# Patient Record
Sex: FEMALE | Race: WHITE | Employment: UNEMPLOYED | URBAN - METROPOLITAN AREA
[De-identification: names, ages, dates, MRNs, and addresses within clinical notes are randomized per-mention and may not be internally consistent; named-entity substitution may affect disease eponyms.]

---

## 2017-07-07 ENCOUNTER — HOSPITAL ENCOUNTER (INPATIENT)
Age: 27
LOS: 6 days | Discharge: HOME | DRG: 885 | End: 2017-07-13
Attending: STUDENT IN AN ORGANIZED HEALTH CARE EDUCATION/TRAINING PROGRAM | Admitting: STUDENT IN AN ORGANIZED HEALTH CARE EDUCATION/TRAINING PROGRAM
Payer: COMMERCIAL

## 2017-07-07 PROBLEM — F31.64 BIPOLAR DISORDER, CURR EPISODE MIXED, SEVERE, WITH PSYCHOTIC FEATURES (HCC): Status: ACTIVE | Noted: 2017-07-07

## 2017-07-07 PROCEDURE — 65220000003 HC RM SEMIPRIVATE PSYCH

## 2017-07-07 PROCEDURE — 74011250637 HC RX REV CODE- 250/637: Performed by: STUDENT IN AN ORGANIZED HEALTH CARE EDUCATION/TRAINING PROGRAM

## 2017-07-07 RX ORDER — LITHIUM CARBONATE 300 MG
300 TABLET ORAL DAILY
Status: DISCONTINUED | OUTPATIENT
Start: 2017-07-08 | End: 2017-07-11

## 2017-07-07 RX ORDER — RISPERIDONE 3 MG/1
6 TABLET, FILM COATED ORAL DAILY
COMMUNITY
End: 2017-07-13

## 2017-07-07 RX ORDER — IBUPROFEN 400 MG/1
400 TABLET ORAL
Status: DISCONTINUED | OUTPATIENT
Start: 2017-07-07 | End: 2017-07-13 | Stop reason: HOSPADM

## 2017-07-07 RX ORDER — HALOPERIDOL 5 MG/1
5 TABLET ORAL
Status: DISCONTINUED | OUTPATIENT
Start: 2017-07-07 | End: 2017-07-13 | Stop reason: HOSPADM

## 2017-07-07 RX ORDER — LITHIUM CARBONATE 300 MG
600 TABLET ORAL
Status: DISCONTINUED | OUTPATIENT
Start: 2017-07-07 | End: 2017-07-11

## 2017-07-07 RX ORDER — LORAZEPAM 2 MG/ML
1-2 INJECTION INTRAMUSCULAR
Status: DISCONTINUED | OUTPATIENT
Start: 2017-07-07 | End: 2017-07-13 | Stop reason: HOSPADM

## 2017-07-07 RX ORDER — QUETIAPINE FUMARATE 100 MG/1
100 TABLET, FILM COATED ORAL
Status: DISCONTINUED | OUTPATIENT
Start: 2017-07-07 | End: 2017-07-08

## 2017-07-07 RX ORDER — TRAZODONE HYDROCHLORIDE 50 MG/1
50 TABLET ORAL
Status: DISCONTINUED | OUTPATIENT
Start: 2017-07-07 | End: 2017-07-13 | Stop reason: HOSPADM

## 2017-07-07 RX ORDER — LORAZEPAM 1 MG/1
1-2 TABLET ORAL
Status: DISCONTINUED | OUTPATIENT
Start: 2017-07-07 | End: 2017-07-09

## 2017-07-07 RX ORDER — HALOPERIDOL 5 MG/ML
5 INJECTION INTRAMUSCULAR
Status: DISCONTINUED | OUTPATIENT
Start: 2017-07-07 | End: 2017-07-13 | Stop reason: HOSPADM

## 2017-07-07 RX ADMIN — LORAZEPAM 2 MG: 1 TABLET ORAL at 21:22

## 2017-07-07 RX ADMIN — RISPERIDONE 3 MG: 1 TABLET ORAL at 21:27

## 2017-07-07 RX ADMIN — LITHIUM CARBONATE 600 MG: 300 TABLET ORAL at 21:27

## 2017-07-07 RX ADMIN — IBUPROFEN 400 MG: 400 TABLET ORAL at 21:14

## 2017-07-07 RX ADMIN — QUETIAPINE FUMARATE 100 MG: 100 TABLET, FILM COATED ORAL at 21:27

## 2017-07-07 NOTE — BH NOTES
Patient arrived on unit escorted by security with anxious and agitation noted. Patient presents with horacio with hyperverbal speech. Patient stated she was admitted for \"Obici couldn't handle me I guess. They kept me there with that  all day and now I'm here\". Patient denies substance use other than Tobacco with \"a few a day\". Patient denies medical history. Patient denies suicidal/homicidal ideation, denies auditory/visual hallucination.

## 2017-07-07 NOTE — IP AVS SNAPSHOT
Current Discharge Medication List  
  
CONTINUE these medications which have CHANGED Dose & Instructions Dispensing Information Comments Morning Noon Evening Bedtime  
 lithium carbonate 300 mg tablet What changed:   
- how much to take - when to take this Your last dose was: Your next dose is:    
   
   
 Dose:  600 mg Take 2 Tabs by mouth two (2) times a day. Indications: BIPOLAR DISORDER Quantity:  28 Tab Refills:  0 QUEtiapine 300 mg tablet Commonly known as:  SEROquel What changed:   
- medication strength 
- how much to take Your last dose was: Your next dose is:    
   
   
 Dose:  300 mg Take 1 Tab by mouth nightly. Indications: Sonia associated with Bipolar Disorder Quantity:  14 Tab Refills:  0  
     
   
   
   
  
 risperiDONE 3 mg tablet Commonly known as:  RisperDAL What changed:   
- how much to take - when to take this 
- additional instructions Your last dose was: Your next dose is:    
   
   
 Dose:  3 mg Take 1 Tab by mouth two (2) times a day. Indications: Sonia associated with Bipolar Disorder Quantity:  28 Tab Refills:  0 STOP taking these medications   
 benztropine 0.5 mg tablet Commonly known as:  COGENTIN  
   
  
 DEPAKOTE  mg ER tablet Generic drug:  divalproex ER  
   
  
 ergocalciferol 50,000 unit capsule Commonly known as:  ERGOCALCIFEROL  
   
  
 haloperidol 10 mg tablet Commonly known as:  HALDOL  
   
  
 haloperidol 5 mg tablet Commonly known as:  HALDOL  
   
  
 NECON 1/35 (28) 1-35 mg-mcg Tab Generic drug:  norethindrone-ethinyl estradiol Where to Get Your Medications These medications were sent to [facility] [place] [address] Phone:  [phone]  
  lithium carbonate 300 mg tablet QUEtiapine 300 mg tablet risperiDONE 3 mg tablet

## 2017-07-07 NOTE — IP AVS SNAPSHOT
Steve Mcguire 
 
 
 [address] Patient: Victoriano Cotter MRN: [id number] You are allergic to the following No active allergies Recent Documentation Height Weight Breastfeeding? BMI OB Status Smoking Status 1.626 m 81.6 kg No 30.9 kg/m2 Having regular periods Current Every Day Smoker Emergency Contacts Name Discharge Info Relation Home Work Mobile Brooke Rojasjose THORNE DISCHARGE CAREGIVER [3] Parent [1] [phone] About your hospitalization You were admitted on:  July 7, 2017 You last received care in the:  [facility] SPECIAL TRT 1 You were discharged on:  July 13, 2017 Unit phone number:  [phone] Why you were hospitalized Your primary diagnosis was:  Bipolar Disorder, Curr Episode Mixed, Severe, With Psychotic Features (Hcc) Providers Seen During Your Hospitalizations Provider Role Specialty Primary office phone Kim Church MD Attending Provider Psychiatry [phone] Your Primary Care Physician (PCP) Primary Care Physician Office Phone Office Fax Jaimie Crowley [phone] Follow-up Information Follow up With Details Comments Contact Info [address] [facility]  Patient will follow up with Dr. Tabitha Ledezma on July 14, 2017 at 11:30 am at [employer]. [address] [place] [address] [address] [id number] [phone] Your Appointments Friday July 21, 2017 10:45 AM EDT  
COMPLETE PHYSICAL with Tiny Hermosillo MD  
[address] [facility] [address] [place] [address]  
[phone] Current Discharge Medication List  
  
CONTINUE these medications which have CHANGED Dose & Instructions Dispensing Information Comments Morning Noon Evening Bedtime lithium carbonate 300 mg tablet What changed:   
- how much to take - when to take this Your last dose was: Your next dose is:    
   
   
 Dose:  600 mg Take 2 Tabs by mouth two (2) times a day. Indications: BIPOLAR DISORDER Quantity:  28 Tab Refills:  0 QUEtiapine 300 mg tablet Commonly known as:  SEROquel What changed:   
- medication strength 
- how much to take Your last dose was: Your next dose is:    
   
   
 Dose:  300 mg Take 1 Tab by mouth nightly. Indications: Sonia associated with Bipolar Disorder Quantity:  14 Tab Refills:  0  
     
   
   
   
  
 risperiDONE 3 mg tablet Commonly known as:  RisperDAL What changed:   
- how much to take - when to take this 
- additional instructions Your last dose was: Your next dose is:    
   
   
 Dose:  3 mg Take 1 Tab by mouth two (2) times a day. Indications: Sonia associated with Bipolar Disorder Quantity:  28 Tab Refills:  0 STOP taking these medications   
 benztropine 0.5 mg tablet Commonly known as:  COGENTIN  
   
  
 DEPAKOTE  mg ER tablet Generic drug:  divalproex ER  
   
  
 ergocalciferol 50,000 unit capsule Commonly known as:  ERGOCALCIFEROL  
   
  
 haloperidol 10 mg tablet Commonly known as:  HALDOL  
   
  
 haloperidol 5 mg tablet Commonly known as:  HALDOL  
   
  
 NECON 1/35 (28) 1-35 mg-mcg Tab Generic drug:  norethindrone-ethinyl estradiol Where to Get Your Medications These medications were sent to [address] [place] [address] [address] [address] Phone:  [phone]  
  lithium carbonate 300 mg tablet QUEtiapine 300 mg tablet  
 risperiDONE 3 mg tablet Discharge Instructions BEHAVIORAL HEALTH NURSING DISCHARGE NOTE Emergency Numbers [facility] [facility] Desk: [phone] [place] [facility]: [phone] Suicide Prevention Line: [phone] (TALK) The following personal items collected during your admission are returned to you:  
Dental Appliance: Dental Appliances: None Vision: Visual Aid: Contacts Hearing Aid:   
Jewelry: Jewelry: Bracelet, Earrings, Necklace, Ring (Security Safe) Clothing: Clothing: Shorts, Shirt, Undergarments, Other (comment) (flip flops) Other Valuables: Other Valuables:  Yefri Pickup glasses) Valuables sent to safe: Personal Items Sent to Safe:  Ansari Coma w/ I.D;Jewelry(2-rings;ear rings;bracelet;chain)lhtr) The discharge information has been reviewed with the patient. The patient verbalized understanding. GuestMetrics Activation Thank you for requesting access to Guest[employer]. Please follow the instructions below to securely access and download your online medical record. Guest[employer] allows you to send messages to your doctor, view your test results, renew your prescriptions, schedule appointments, and more. How Do I Sign Up? 1. In your internet browser, go to [email or web address] 
2. Click on the First Time User? Click Here link in the Sign In box. You will be redirect to the New Member Sign Up page. 3. Enter your GuestMetrics Access Code exactly as it appears below. You will not need to use this code after youve completed the sign-up process. If you do not sign up before the expiration date, you must request a new code. Guest[employer] Access Code: Activation code not generated Current GuestMetrics Status: Active (This is the date your GuestMetrics access code will ) 4. Enter the last four digits of your Social Security Number (xxxx) and Date of Birth (mm/dd/yyyy) as indicated and click Submit. You will be taken to the next sign-up page. 5. Create a GuestMetrics ID. This will be your GuestMetrics login ID and cannot be changed, so think of one that is secure and easy to remember. 6. Create a Lightning[employer] password. You can change your password at any time. 7. Enter your Password Reset Question and Answer. This can be used at a later time if you forget your password. 8. Enter your e-mail address. You will receive e-mail notification when new information is available in [address] 9. Click Sign Up. You can now view and download portions of your medical record. 10. Click the Download Summary menu link to download a portable copy of your medical information. Additional Information If you have questions, please visit the Frequently Asked Questions section of the [employer] website at [email or web address] Remember, LightningBuy is NOT to be used for urgent needs. For medical emergencies, dial 911. Patient armband removed and shredded Discharge Orders None [facility] SERVICES! Dear Saida Marte: 
Thank you for requesting a Lightning[employer] account. Our records indicate that you already have an active [employer] account. You can access your account anytime at [email or web address] Did you know that you can access your hospital and ER discharge instructions at any time in [employer]? You can also review all of your test results from your hospital stay or ER visit. Additional Information If you have questions, please visit the Frequently Asked Questions section of the [employer] website at [email or web address] Remember, [employer] is NOT to be used for urgent needs. For medical emergencies, dial 911. Now available from your iPhone and Android! General Information Please provide this summary of care documentation to your next provider. Patient Signature:  ____________________________________________________________ Date:  ____________________________________________________________  
  
Coy Matsu Provider Signature:  ____________________________________________________________ Date:  ____________________________________________________________

## 2017-07-08 PROCEDURE — 65220000003 HC RM SEMIPRIVATE PSYCH

## 2017-07-08 PROCEDURE — 74011250637 HC RX REV CODE- 250/637: Performed by: STUDENT IN AN ORGANIZED HEALTH CARE EDUCATION/TRAINING PROGRAM

## 2017-07-08 PROCEDURE — 74011250637 HC RX REV CODE- 250/637: Performed by: PSYCHIATRY & NEUROLOGY

## 2017-07-08 RX ORDER — NICOTINE 7MG/24HR
1 PATCH, TRANSDERMAL 24 HOURS TRANSDERMAL DAILY
Status: DISCONTINUED | OUTPATIENT
Start: 2017-07-08 | End: 2017-07-13 | Stop reason: HOSPADM

## 2017-07-08 RX ORDER — QUETIAPINE FUMARATE 100 MG/1
100 TABLET, FILM COATED ORAL 2 TIMES DAILY
Status: DISCONTINUED | OUTPATIENT
Start: 2017-07-08 | End: 2017-07-09

## 2017-07-08 RX ADMIN — LITHIUM CARBONATE 600 MG: 300 TABLET ORAL at 20:58

## 2017-07-08 RX ADMIN — LORAZEPAM 2 MG: 1 TABLET ORAL at 08:22

## 2017-07-08 RX ADMIN — RISPERIDONE 3 MG: 1 TABLET ORAL at 08:22

## 2017-07-08 RX ADMIN — TRAZODONE HYDROCHLORIDE 50 MG: 50 TABLET ORAL at 20:58

## 2017-07-08 RX ADMIN — LITHIUM CARBONATE 300 MG: 300 TABLET ORAL at 08:22

## 2017-07-08 RX ADMIN — QUETIAPINE FUMARATE 100 MG: 100 TABLET, FILM COATED ORAL at 20:58

## 2017-07-08 RX ADMIN — RISPERIDONE 3 MG: 1 TABLET ORAL at 20:58

## 2017-07-08 NOTE — H&P
History and Physical        Patient: Godwin Felix               Sex: female          DOA: 2017         YOB: 1990      Age:  27 y.o.        LOS:  LOS: 1 day      medhat  HPI:     Godwin eFlix is a 27 y.o. female who was admitted under TDO experiencing psychosis, pressured speech, delusional thinking. Principal Problem:    Bipolar disorder, curr episode mixed, severe, with psychotic features ([facility] [address] (2017)        Past Medical History:   Diagnosis Date    Bipolar depression ([facility]     Desmoid tumor 2014    for excision       Past Surgical History:   Procedure Laterality Date    HX  SECTION  2009     vaginal birth then c section       Family History   Problem Relation Age of Onset    Asthma Mother     Hypertension Father     Elevated Lipids Father     Diabetes Father     Heart Disease Father        Social History     Social History    Marital status:      Spouse name: N/A    Number of children: 2    Years of education: GED     Occupational History    n/a      Social History Main Topics    Smoking status: Current Every Day Smoker     Packs/day: 1.00     Years: 9.00     Types: Cigarettes    Smokeless tobacco: Never Used    Alcohol use No      Comment: 1 x per week (socially)    Drug use: No    Sexual activity: Yes     Partners: Male     Birth control/ protection: Pill     Other Topics Concern    None     Social History Narrative   Patient states she lives with her grandparents. States her appetite and sleep have been okay. She  Is unemployed. Prior to Admission medications    Medication Sig Start Date End Date Taking? Authorizing Provider   risperiDONE (RISPERDAL) 3 mg tablet Take 6 mg by mouth daily. Patient is unsure of medication schedule   Yes Historical Provider   QUEtiapine (SEROQUEL) 50 mg tablet Take 50 mg by mouth nightly. 3/6/15  Yes Historical Provider   norethindrone-ethinyl estradiol (Santo Hylan , ,) 1-35 mg-mcg per tablet Take  by mouth. Yes Historical Provider   ergocalciferol (ERGOCALCIFEROL) 50,000 unit capsule Take 1 Cap by mouth every seven (7) days. 9/1/15   Mary Hernadez MD   haloperidol (HALDOL) 5 mg tablet Take 5 mg by mouth daily. 3/6/15   Historical Provider   divalproex ER (DEPAKOTE ER) 500 mg ER tablet Take 500 mg by mouth two (2) times a day. Historical Provider   lithium 300 mg tablet Take 900 mg by mouth daily. Historical Provider   haloperidol (HALDOL) 10 mg tablet Take 5 mg by mouth daily. Historical Provider   benztropine (COGENTIN) 0.5 mg tablet Take 0.5 mg by mouth daily. Historical Provider       No Known Allergies    Review of Systems  A comprehensive review of systems was negative. Physical Exam:      Visit Vitals    /68 (BP 1 Location: Right arm, BP Patient Position: Sitting)    Pulse 72    Temp 97.1 °F (36.2 °C)    Resp 18    Ht 5' 4\" (1.626 m)    Wt 180 lb (81.6 kg)    Breastfeeding No    BMI 30.9 kg/m2       Physical Exam:    General:  Alert, cooperative, well developed, well nourished  female,no distress, appears stated age. Eyes:  Conjunctivae/corneas clear. PERRL, EOMs intact. Fundi benign   Ears:  Normal TMs and external ear canals both ears. Nose: Nares normal. Septum midline. Mucosa normal. No drainage or sinus tenderness. Mouth/Throat: Lips, mucosa, and tongue normal. Teeth in disrepair and gums normal.   Neck: Supple, symmetrical, trachea midline, no adenopathy, thyroid: no enlargement/tenderness/nodules, no carotid bruit and no JVD. Back:   Symmetric, no curvature. ROM normal. No CVA tenderness. Lungs:   Clear to auscultation bilaterally. Heart:  Regular rate and rhythm, S1, S2 normal, no murmur, click, rub or gallop. Abdomen:   Soft, non-tender. Bowel sounds normal. No masses,  No organomegaly. Extremities: Extremities normal, atraumatic, no cyanosis or edema. Pulses: 2+ and symmetric all extremities.    Skin: Skin color, texture, turgor normal. No rashes or lesions   Lymph nodes: Cervical, supraclavicular, and axillary nodes normal.   Neurologic: CNII-XII intact. Normal strength, sensation and reflexes throughout.              Assessment/Plan     Delusional  Psychosis  Pressured speech  Labs reviewed  Continue treatment per physician's orders
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
## 2017-07-08 NOTE — PROGRESS NOTES
Problem: Psychosis  Goal: *STG: Decreased hallucinations  Patient will verbalize denial of auditory/visual hallucinations every shift throughout hospital stay. Outcome: Progressing Towards Goal  Patient denies auditory hallucination. Goal: *STG/LTG: Complies with medication therapy  Patient will take medication as prescribed every shift throughout hospital stay. Outcome: Progressing Towards Goal  Patient has been compliant with prescribed medication. Patient has been in and out of her room throughout the morning with manic behavior and intermittent agitation. Patient has been easily redirectable however continually approaches writer requesting to of over to visit on the other side stating \"I need to do something I just need to do something\". Patient has been observed arguing with mother on telephone throughout the morning. Patient received Ativan 2 mg po for anxious and restlessness with increasing moments of agitation however has not presented with aggressive behaviors towards staff. Patient denies suicidal ideation, Patient states \"I don't hear stuff like that ok\". Patient appears tired however states she is unable to sleep and after resting for 10-15 minutes will get up and state \"I'm not tired I sept all day now see\". Will provide reality orientation and and support as needed throughout treatment regimen.

## 2017-07-08 NOTE — BH NOTES
Patient's mother presented to nursing station with demanding and entitled behavior. Mother demanding to know why patient's MD had not been called and made aware of patient's admission. Mother wanted writer to assure her that patient would not be released. Patient attempting to bring items in that were not allowed. Mother was previously advised of allowable items with verbalization of understanding. Patient's mother was educated and assured SW would be made aware of her request. Patient was observed pacing throughout milieu all shift and hs been compliant with prescribed medications. Patient denied suicidal ideation, denies auditory hallucinations. Will continue to monitor for safety with support and education throughout treatment regimen.

## 2017-07-09 LAB
ALBUMIN SERPL BCP-MCNC: 3.6 G/DL (ref 3.4–5)
ALBUMIN/GLOB SERPL: 1.1 {RATIO} (ref 0.8–1.7)
ALP SERPL-CCNC: 76 U/L (ref 45–117)
ALT SERPL-CCNC: 36 U/L (ref 13–56)
ANION GAP BLD CALC-SCNC: 5 MMOL/L (ref 3–18)
AST SERPL W P-5'-P-CCNC: 15 U/L (ref 15–37)
BASOPHILS # BLD AUTO: 0 K/UL (ref 0–0.1)
BASOPHILS # BLD: 0 % (ref 0–2)
BILIRUB SERPL-MCNC: 0.3 MG/DL (ref 0.2–1)
BUN SERPL-MCNC: 9 MG/DL (ref 7–18)
BUN/CREAT SERPL: 12 (ref 12–20)
CALCIUM SERPL-MCNC: 8.9 MG/DL (ref 8.5–10.1)
CHLORIDE SERPL-SCNC: 106 MMOL/L (ref 100–108)
CHOLEST SERPL-MCNC: 143 MG/DL
CO2 SERPL-SCNC: 29 MMOL/L (ref 21–32)
CREAT SERPL-MCNC: 0.74 MG/DL (ref 0.6–1.3)
DIFFERENTIAL METHOD BLD: ABNORMAL
EOSINOPHIL # BLD: 0.2 K/UL (ref 0–0.4)
EOSINOPHIL NFR BLD: 2 % (ref 0–5)
ERYTHROCYTE [DISTWIDTH] IN BLOOD BY AUTOMATED COUNT: 13.5 % (ref 11.6–14.5)
EST. AVERAGE GLUCOSE BLD GHB EST-MCNC: 105 MG/DL
GLOBULIN SER CALC-MCNC: 3.4 G/DL (ref 2–4)
GLUCOSE SERPL-MCNC: 96 MG/DL (ref 74–99)
HBA1C MFR BLD: 5.3 % (ref 4.2–5.6)
HCT VFR BLD AUTO: 38.5 % (ref 35–45)
HDLC SERPL-MCNC: 46 MG/DL (ref 40–60)
HDLC SERPL: 3.1 {RATIO} (ref 0–5)
HGB BLD-MCNC: 12.8 G/DL (ref 12–16)
LDLC SERPL CALC-MCNC: 82.6 MG/DL (ref 0–100)
LIPID PROFILE,FLP: NORMAL
LITHIUM SERPL-SCNC: 0.68 MMOL/L (ref 0.6–1.2)
LYMPHOCYTES # BLD AUTO: 29 % (ref 21–52)
LYMPHOCYTES # BLD: 2.6 K/UL (ref 0.9–3.6)
MCH RBC QN AUTO: 31.8 PG (ref 24–34)
MCHC RBC AUTO-ENTMCNC: 33.2 G/DL (ref 31–37)
MCV RBC AUTO: 95.5 FL (ref 74–97)
MONOCYTES # BLD: 0.5 K/UL (ref 0.05–1.2)
MONOCYTES NFR BLD AUTO: 6 % (ref 3–10)
NEUTS SEG # BLD: 5.6 K/UL (ref 1.8–8)
NEUTS SEG NFR BLD AUTO: 63 % (ref 40–73)
PLATELET # BLD AUTO: 226 K/UL (ref 135–420)
PMV BLD AUTO: 11 FL (ref 9.2–11.8)
POTASSIUM SERPL-SCNC: 4.1 MMOL/L (ref 3.5–5.5)
PROT SERPL-MCNC: 7 G/DL (ref 6.4–8.2)
RBC # BLD AUTO: 4.03 M/UL (ref 4.2–5.3)
SODIUM SERPL-SCNC: 140 MMOL/L (ref 136–145)
TRIGL SERPL-MCNC: 72 MG/DL (ref ?–150)
TSH SERPL DL<=0.05 MIU/L-ACNC: 2.47 UIU/ML (ref 0.36–3.74)
VLDLC SERPL CALC-MCNC: 14.4 MG/DL
WBC # BLD AUTO: 8.8 K/UL (ref 4.6–13.2)

## 2017-07-09 PROCEDURE — 80178 ASSAY OF LITHIUM: CPT | Performed by: PSYCHIATRY & NEUROLOGY

## 2017-07-09 PROCEDURE — 83036 HEMOGLOBIN GLYCOSYLATED A1C: CPT | Performed by: PSYCHIATRY & NEUROLOGY

## 2017-07-09 PROCEDURE — 85025 COMPLETE CBC W/AUTO DIFF WBC: CPT | Performed by: PSYCHIATRY & NEUROLOGY

## 2017-07-09 PROCEDURE — 36415 COLL VENOUS BLD VENIPUNCTURE: CPT | Performed by: PSYCHIATRY & NEUROLOGY

## 2017-07-09 PROCEDURE — 80053 COMPREHEN METABOLIC PANEL: CPT | Performed by: PSYCHIATRY & NEUROLOGY

## 2017-07-09 PROCEDURE — 80061 LIPID PANEL: CPT | Performed by: PSYCHIATRY & NEUROLOGY

## 2017-07-09 PROCEDURE — 74011250637 HC RX REV CODE- 250/637: Performed by: STUDENT IN AN ORGANIZED HEALTH CARE EDUCATION/TRAINING PROGRAM

## 2017-07-09 PROCEDURE — 74011250637 HC RX REV CODE- 250/637: Performed by: PSYCHIATRY & NEUROLOGY

## 2017-07-09 PROCEDURE — 65220000003 HC RM SEMIPRIVATE PSYCH

## 2017-07-09 PROCEDURE — 84443 ASSAY THYROID STIM HORMONE: CPT | Performed by: PSYCHIATRY & NEUROLOGY

## 2017-07-09 RX ORDER — QUETIAPINE FUMARATE 200 MG/1
200 TABLET, FILM COATED ORAL
Status: DISCONTINUED | OUTPATIENT
Start: 2017-07-09 | End: 2017-07-12

## 2017-07-09 RX ORDER — HYDROXYZINE PAMOATE 50 MG/1
50 CAPSULE ORAL
Status: DISCONTINUED | OUTPATIENT
Start: 2017-07-09 | End: 2017-07-13 | Stop reason: HOSPADM

## 2017-07-09 RX ADMIN — RISPERIDONE 3 MG: 1 TABLET ORAL at 20:10

## 2017-07-09 RX ADMIN — LORAZEPAM 1 MG: 1 TABLET ORAL at 02:36

## 2017-07-09 RX ADMIN — QUETIAPINE FUMARATE 100 MG: 100 TABLET, FILM COATED ORAL at 08:13

## 2017-07-09 RX ADMIN — HYDROXYZINE PAMOATE 50 MG: 25 CAPSULE ORAL at 20:10

## 2017-07-09 RX ADMIN — LITHIUM CARBONATE 300 MG: 300 TABLET ORAL at 08:13

## 2017-07-09 RX ADMIN — LITHIUM CARBONATE 600 MG: 300 TABLET ORAL at 20:09

## 2017-07-09 RX ADMIN — RISPERIDONE 3 MG: 1 TABLET ORAL at 08:13

## 2017-07-09 RX ADMIN — QUETIAPINE FUMARATE 200 MG: 200 TABLET, FILM COATED ORAL at 20:10

## 2017-07-09 NOTE — PROGRESS NOTES
[address] [facility] [address]  Inpatient Progress Note     Date of Service: 07/09/17  Hospital Day: 2     Subjective/Interval History   07/09/17    Treatment Team Notes:  Notes reviewed and/or discussed and report that Jennifer Guzman is on 1:1 due to hx of sexualized behaviors while manic. She received Ativan at 8am yesterday and 2am this morning. Pt has appeared restless after receiving Ativan this morning. Patient interview: Jennifer Guzman was interviewed by this writer today. Pt was visited by her mother. She reports conflict with mom prior to hospitalization \"because we both are manic. \" Pt reported sleeping during the day and getting up at 2am because she slept to much. Pt denies SI/HI/AVH. Pt feels that morning Seroquel is causing her to be drowsy. Objective     Vitals:    07/07/17 1951 07/08/17 1112 07/08/17 2030 07/09/17 0742   BP: 116/72 106/68 112/71 114/67   Pulse: 94 72 87 80   Resp: 17 18 17 16   Temp: 96.8 °F (36 °C) 97.1 °F (36.2 °C) 97 °F (36.1 °C) 96.7 °F (35.9 °C)   Weight:       Height:           Mental Status Examination     Appearance/Hygiene CF, good hygiene, poor dentition    Behavior/Social Relatedness Appropriate   Musculoskeletal Gait/Station: appropriate  Psychomotor (hyperkinetic, hypokinetic): appropriate   Involuntary movements (tics, dyskinesias, akathisa, stereotypies): none   Speech   Rate, rhythm, volume, fluency and articulation are appropriate   Mood   euthymic   Affect    stable   Thought Process Linear   Thought Content and Perceptual Disturbances Denies self-injurious behavior (SIB), suicidal ideation (SI), aggressive behavior or homicidal ideation (HI)    Denies auditory and visual hallucinations   Sensorium and Cognition  AOx4, attention intact, memory intact, language use appropriate, and fund of knowledge age appropriate   Insight  fair   Judgment fair        Assessment/Plan      PSYCHIATRIC DIAGNOSES:  1. Bipolar 1 disorder, most recent episode manic.   2. Nicotine use disorder, severe. 3.  Marijuana use disorder, mild.     MEDICAL DIAGNOSES:  None.     PSYCHOSOCIAL AND CONTEXTUAL FACTORS:  Conflict with mother and  noncompliance with outpatient treatment.     LEVEL OF IMPAIRMENT/DISABILITY:  Severe.     The patient is a 78-year-old  female who is experiencing increased day time sedation from the increase of SEroquel to 100mg BID. Will consolidate and follow. 1.  Convert Seroquel IR 200m BID to Seroquel XR 200mg nightly  3. Continue Risperdal 3 mg p.o. b.i.d.  4.  Continue lithium 300 mg in the morning and 600 mg at night. Li .68  5.  will assist in identifying outpatient mental health  resources.   6.  Tentative discharge is pending.           Reza Traylor MD

## 2017-07-09 NOTE — BH NOTES
Patient napped for a short while at start of shift. Patient spent the majority of this shift in day room participating in milieu activities and socializing with peers. Patient did not need redirection during this shift. Patient was provided with 1:1 support throughout shift and displayed no behavioral issues. Patient presented restless at times but was able to focus attention on things such as puzzles, coloring and phone calls to family. Will continue to provide 1:1 support.

## 2017-07-09 NOTE — PROGRESS NOTES
Problem: Psychosis  Goal: *STG: Decreased hallucinations  Patient will verbalize denial of auditory/visual hallucinations every shift throughout hospital stay. Outcome: Progressing Towards Goal  Patient denies auditory/visual hallucinations. Goal: *STG: Remains safe in hospital  Patient will remain free of harm to self and others every shift throughout hospitalization. Outcome: Progressing Towards Goal  Patient has remained free of harm to self and others while in facility. Goal: *STG/LTG: Complies with medication therapy  Patient will take medication as prescribed every shift throughout hospital stay. Outcome: Progressing Towards Goal  Patient has been compliant with prescribed medication. Comments:   Patient was initially anxious, restless and hyperverbal with patient complaining of feeling like she needed to do something. Patient is easily redirectable however required education related to boundaries and behaviors towards male staff that enter unit. Patient questions whether males will be admitted to unit and when MD entered unit patient stated \"oh who's that, oh he's cute\". Patient was open to education regarding expected behaviors on unit. Patient has showered and completed daily hygiene care. After receiving scheduled medication patient was less anxious however continue to pace throughout milieu. Patient denies suicidal ideation with no safety issues noted, denies auditory hallucinations. Will continue to monitor for safety and poor boundaries with male peers. While providing support and education throughout treatment regimen.

## 2017-07-09 NOTE — BH NOTES
GROUP THERAPY PROGRESS NOTE    Soumya Ritter is participating in [place].      Group time: 30 minutes    Personal goal for participation: use the phone    Goal orientation: community    Group therapy participation: minimal    Therapeutic interventions reviewed and discussed: goals and procedures    Impression of participation: encouraged

## 2017-07-09 NOTE — BH NOTES
GROUP THERAPY PROGRESS NOTE    Silva Severe is participating in Leisure-Creative Group.      Group time: 45 minutes    Personal goal for participation: relaxation and education     Goal orientation: relaxation    Group therapy participation: active    Therapeutic interventions reviewed and discussed: patient watched educational program     Impression of participation: happy

## 2017-07-09 NOTE — BH NOTES
Pt woke up at approximately 0231 and asked this writer for the time. \"Man,I've been sleep for over eight hours. I can't sleep anymore. \" It was explained to her that she was only asleep for 4 1/2 hours and it was still early in the morning. Pt nodded \"okay\" and stated that her contacts were bothering her. RN gave her a container to place them in. Pt was a little restless; went back and forth between her room and the restroom. RN offered her medication for anxiety and PT complied. Pt fell asleep at approximately 0400, but is awake at this time. Pt appears to be very drowsy and speech is slurred; encouraged her to lay back down and let the medication do it's job. Pt seems to be fighting sleep; repeatedly lays down and gets back up. Pt is pleasant, just a little anxious. Will continue to monitor throughout the shift.

## 2017-07-09 NOTE — BH NOTES
GROUP THERAPY PROGRESS NOTE    Levorn Severe is participating in Recreational Therapy. Group time: 45 minutes    Personal goal for participation: relaxation     Goal orientation: relaxation    Group therapy participation: active    Therapeutic interventions reviewed and discussed:  PT participated in board game.      Impression of participation: happy

## 2017-07-10 PROCEDURE — 74011250637 HC RX REV CODE- 250/637: Performed by: STUDENT IN AN ORGANIZED HEALTH CARE EDUCATION/TRAINING PROGRAM

## 2017-07-10 PROCEDURE — 65220000003 HC RM SEMIPRIVATE PSYCH

## 2017-07-10 PROCEDURE — 74011250637 HC RX REV CODE- 250/637: Performed by: PSYCHIATRY & NEUROLOGY

## 2017-07-10 RX ADMIN — QUETIAPINE FUMARATE 200 MG: 200 TABLET, FILM COATED ORAL at 20:03

## 2017-07-10 RX ADMIN — RISPERIDONE 3 MG: 1 TABLET ORAL at 20:02

## 2017-07-10 RX ADMIN — IBUPROFEN 400 MG: 400 TABLET ORAL at 16:56

## 2017-07-10 RX ADMIN — LITHIUM CARBONATE 600 MG: 300 TABLET ORAL at 20:03

## 2017-07-10 RX ADMIN — IBUPROFEN 400 MG: 400 TABLET ORAL at 05:06

## 2017-07-10 RX ADMIN — RISPERIDONE 3 MG: 1 TABLET ORAL at 08:10

## 2017-07-10 RX ADMIN — HYDROXYZINE PAMOATE 50 MG: 25 CAPSULE ORAL at 05:06

## 2017-07-10 RX ADMIN — HYDROXYZINE PAMOATE 50 MG: 25 CAPSULE ORAL at 23:33

## 2017-07-10 RX ADMIN — TRAZODONE HYDROCHLORIDE 50 MG: 50 TABLET ORAL at 02:27

## 2017-07-10 RX ADMIN — IBUPROFEN 400 MG: 400 TABLET ORAL at 20:03

## 2017-07-10 RX ADMIN — LITHIUM CARBONATE 300 MG: 300 TABLET ORAL at 08:11

## 2017-07-10 NOTE — PROGRESS NOTES
Problem: Psychosis  Goal: *STG: Decreased hallucinations  Patient will verbalize denial of auditory/visual hallucinations every shift throughout hospital stay. Outcome: Progressing Towards Goal  Patient denies auditory hallucinations. Goal: *STG: Remains safe in hospital  Patient will remain free of harm to self and others every shift throughout hospitalization. Outcome: Progressing Towards Goal  Patient has remained free of harm to self and others while in facility. Goal: *STG/LTG: Complies with medication therapy  Patient will take medication as prescribed every shift throughout hospital stay. Outcome: Progressing Towards Goal  Patient has been compliant with prescribed medication. Comments:   Patient has been cooperative since returning from court. Patient was initially upset after returning from court however was able to process and has not been a management problem. Patient paces throughout day area and has been a bit anxious with fidgety behavior. Patient reported she is trying to set discharge goal however minimizes her part when discussing her hyper sexual behaviors. Patient denies auditory hallucinations. Patient has been receptive and open to education. Will continue to monitor for safety.

## 2017-07-10 NOTE — BH NOTES
GROUP THERAPY PROGRESS NOTE    Kassidy Pedro is participating in Recreational Therapy. Group time: 25 minutes    Personal goal for participation: Relaxation/Social    Goal orientation: relaxation    Group therapy participation: active    Therapeutic interventions reviewed and discussed: Relaxation/Social    Impression of participation: Pt fully engaged in Recreational Therapy. Pt played basketball and sat in the breeze with this writer. Pt took the time to open up about significant life events;had a great conversation about her family.

## 2017-07-10 NOTE — BSMART NOTE
OCCUPATIONAL THERAPY PROGRESS NOTE    Group Time:  8582  Attendance: The patient attended 1/4 of group. The patient left and returned to activity at least four times. Participation:  The patient participated with minimal elaboration in the activity. Attention:  The patient was unable to attend to the activity. Interaction:  The patient frequently interacts with others. In and out of group often. Appeared restless and unable to focus.

## 2017-07-10 NOTE — BH NOTES
Pt woke up at approximately 0210. Pt offered PRN medication for sleep;initially refused, but 15 minutes later decided to take it. Will continue to monitor for safety throughout the shift.

## 2017-07-10 NOTE — PROGRESS NOTES
[address]  Inpatient Progress Note     Date of Service: 07/10/17  Hospital Day: 3     Subjective/Interval History   07/10/17    Treatment Team Notes:  Notes reviewed and/or discussed and report that John Murdock is on 1:1 due to hx of sexualized behaviors while manic. Patient interview: John Murdock was interviewed by this writer today. Appears anxious and labile at times, pacing room and very fidgety, walking out of room at times and had to be redirected to finish assessment. Otherwise, calm, cooperative. Hyperverbal but interruptable and prone to off topic and tangentiality but redirectable. Pt denies SI/HI/AVH. Objective     Vitals:    07/08/17 2030 07/09/17 0742 07/09/17 1914 07/10/17 0745   BP: 112/71 114/67 121/74 116/73   Pulse: 87 80 87 78   Resp: 17 16 17 18   Temp: 97 °F (36.1 °C) 96.7 °F (35.9 °C) 95.1 °F (35.1 °C) 97.3 °F (36.3 °C)   Weight:       Height:           Mental Status Examination     Appearance/Hygiene CF, good hygiene, poor dentition    Behavior/Social Relatedness Appropriate   Musculoskeletal Gait/Station: appropriate  Psychomotor (hyperkinetic, hypokinetic): appropriate   Involuntary movements (tics, dyskinesias, akathisa, stereotypies): none   Speech   Hyperverbal, anxious tone   Mood   \"okay\"   Affect    anxious   Thought Process Tangential at times   Thought Content and Perceptual Disturbances Denies self-injurious behavior (SIB), suicidal ideation (SI), aggressive behavior or homicidal ideation (HI)    Denies auditory and visual hallucinations   Sensorium and Cognition  AOx4, attention intact, memory intact, language use appropriate, and fund of knowledge age appropriate   Insight  fair   Judgment fair        Assessment/Plan      PSYCHIATRIC DIAGNOSES:  1. Bipolar 1 disorder, most recent episode manic. 2.  Nicotine use disorder, severe.   3.  Marijuana use disorder, mild.     MEDICAL DIAGNOSES:  None.     PSYCHOSOCIAL AND CONTEXTUAL FACTORS:  Break up with boyfriend, Conflict with mother and  noncompliance with outpatient treatment.     LEVEL OF IMPAIRMENT/DISABILITY:  Severe.     The patient is a 22-year-old  female who remains manic, continuing to manage medications and contain environment and safety. 1.  Continue Seroquel XR 200mg nightly  3. Continue Risperdal 3 mg p.o. b.i.d.  4.  Continue lithium 300 mg in the morning and 600 mg at night. Li .68  5.  will assist in identifying outpatient mental health  resources.   6.  Tentative discharge is pending.        Luis Felipe Hamilton MD

## 2017-07-10 NOTE — BH NOTES
GROUP THERAPY PROGRESS NOTE    Cris Nudinh is participating in [place].      Group time: 30 minutes    Personal goal for participation: discuss daily Tx goal(s), discuss unit issues and guideline compliance         Goal orientation: personal    Group therapy participation: active

## 2017-07-10 NOTE — BH NOTES
Pt was positive throughout the shift today; not a management problem. Pt was a 1:1 with this writer and opened up quite a bit. Pt spoke about her son and daughter, whom she states that she misses very much. 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 Pt states that she goes to court tomorrow and is hoping that she is discharged soon after. Pt did not have visitors this shift, but was observed talking on the phone to her children, mother and father; conversation seemed to go well. Pt was very fidgety and anxious throughout the shift; states that it's just hard for her to keep still, even when she's at home. She completed personal hygiene tasks for the evening, ate most of her dinner meal and 100% of her snack. Pt is utilizing non-skid footwear and is free of falls. Pt denies SI, HI, AH, and VH. Pt contracts for safety on the unit. Will continue to monitor.

## 2017-07-10 NOTE — BSMART NOTE
Pt ate 100 percent of her breakfast and lunch tray. Pt is cooperative during approached. Pt participated in the community group during shift. Pt wear proper footwear on the unit. Pt interacted with peers and staffs. Pt played games with staffs and peers. Pt was angry earlier because she couldn't go home today. Pt denied any self harm and suicidal ideation during ship. Will continue to monitor pt for location and direction.

## 2017-07-10 NOTE — BH NOTES
FREDRICK discussed case with the treating psychiatrist    FREDRICK Contact: Pt. Is a 27year old female mother of two with history of Bipolar disorder and history of past psych hospitalizations. .  Pt. Was admitted to this facility for manic behaviors and medication non-compliance. FREDRICK met with the pt. To discuss the reason for this admission. The pt. Stated she suffers from Bipolar disorder. Pt admits that she has been suffering from the disorder as a result of post partnum. The pt. Stated she had been stable and medication complaint for the last five years. Pt. Stated she recently broke up with her boyfriend of 68 years. Pt. Stated she has lived with  Individual for the last 5 years. Pt stated she recently moved to her mother's home a couple of days prior to this hospitalization. Pt stated her mother is bipolar as well. Pt stated her ans her mother frequently get into arguments. Pt stated she left her mother home because her mother told her to get out. Pt stated I was not running into traffic or at least I do not think I was. Pt. Attempted to provide a description of her actions and location of her current residence. Pt. Stated she just has been feeling overwhelmed. Pt. Stated she plans to get stable on medication. Pt. Plans to return to her parents home. The pt. receives [facility] mental health services with [phone]. FREDRICK will contact pt.'s CM at [facility]  And mother for a collateral.    FREDRICK Collateral:  FREDRICK was contacted by Dr. Otis Garcia @ [phone] psychiatrist at [address] [place] [address]. Dr. Otis Garcia stated pt. Had became manic. The psychiatrist recommended d for pt to be admitted inpatient. Dr. Otis Garcia stated the pt. Has been referred to PACT services at [facility]. The PACT staff will come out and do an intake assessment. Beryl Orozco PACT staff will come on 7/12/17 @ 2:00.     FREDRICK  Informed the treating psychiatrist

## 2017-07-10 NOTE — BSMART NOTE
ART THERAPY GROUP PROGRESS NOTE    PATIENT SCHEDULED FOR GROUP AT: 14:00    ATTENDANCE: Full    PARTICIPATION LEVEL: Participates fully in the art proces    ATTENTION LEVEL: Able to focus on task    FOCUS: Grounding/ Anxiety reduction     SYMBOLIC & THEMATIC CONTENT AS NOTED IN IMAGERY: She appeared some-what anxious and was noted to shake her leg under the table as she worked on the task. She was agitated by a disruptive group member, however was able to manage her emotions and behavior and removed herself when irritated. She appeared to be focused on male peers, at one point noted to be sitting in a sexualized posture while looking at a male member across the room. She was responsive to re-direction and was able to focus on the task at hand. Her associations and approach to imagery were organized and appropriate.

## 2017-07-11 PROCEDURE — 65220000003 HC RM SEMIPRIVATE PSYCH

## 2017-07-11 PROCEDURE — 74011250637 HC RX REV CODE- 250/637: Performed by: STUDENT IN AN ORGANIZED HEALTH CARE EDUCATION/TRAINING PROGRAM

## 2017-07-11 PROCEDURE — 74011250637 HC RX REV CODE- 250/637: Performed by: PSYCHIATRY & NEUROLOGY

## 2017-07-11 RX ORDER — LITHIUM CARBONATE 300 MG
600 TABLET ORAL 2 TIMES DAILY
Status: DISCONTINUED | OUTPATIENT
Start: 2017-07-11 | End: 2017-07-13 | Stop reason: HOSPADM

## 2017-07-11 RX ADMIN — TRAZODONE HYDROCHLORIDE 50 MG: 50 TABLET ORAL at 01:51

## 2017-07-11 RX ADMIN — HYDROXYZINE PAMOATE 50 MG: 25 CAPSULE ORAL at 16:54

## 2017-07-11 RX ADMIN — RISPERIDONE 3 MG: 1 TABLET ORAL at 20:01

## 2017-07-11 RX ADMIN — HYDROXYZINE PAMOATE 50 MG: 25 CAPSULE ORAL at 12:31

## 2017-07-11 RX ADMIN — RISPERIDONE 3 MG: 1 TABLET ORAL at 08:17

## 2017-07-11 RX ADMIN — LITHIUM CARBONATE 300 MG: 300 TABLET ORAL at 08:17

## 2017-07-11 RX ADMIN — IBUPROFEN 400 MG: 400 TABLET ORAL at 01:51

## 2017-07-11 RX ADMIN — HALOPERIDOL 5 MG: 5 TABLET ORAL at 00:30

## 2017-07-11 RX ADMIN — QUETIAPINE FUMARATE 200 MG: 200 TABLET, FILM COATED ORAL at 20:01

## 2017-07-11 RX ADMIN — LITHIUM CARBONATE 600 MG: 300 TABLET ORAL at 20:02

## 2017-07-11 RX ADMIN — IBUPROFEN 400 MG: 400 TABLET ORAL at 14:03

## 2017-07-11 NOTE — PROGRESS NOTES
Patient is alert and oriented x 4; pleasant and cooperative; Level III Safety Precautions maintained; staff is 1:1 with patient; will continue to monitor.

## 2017-07-11 NOTE — PROGRESS NOTES
Problem: Falls - Risk of  Goal: *Absence of falls  Patient will remain free of falls every shift throughout hospital stay. Outcome: Progressing Towards Goal  No falls     Problem: Psychosis  Goal: *STG: Decreased hallucinations  Patient will verbalize denial of auditory/visual hallucinations every shift throughout hospital stay. Outcome: Progressing Towards Goal  Denies   Goal: *STG: Decreased delusional thinking  Patient will verbalize a decrease or be free of delusional thoughts every shift throughout hospital stay. Outcome: Progressing Towards Goal  Denies and not expressing any delusional thoughts   Goal: *STG: Remains safe in hospital  Patient will remain free of harm to self and others every shift throughout hospitalization. Outcome: Progressing Towards Goal  No physical or verbal aggression  Goal: *STG: Participates in individual and group therapy  Patient will attend at least 50% or 2 of 4 groups daily throughout hospital stay. .   Outcome: Progressing Towards Goal  Participating in groups   Goal: *STG/LTG: Complies with medication therapy  Patient will take medication as prescribed every shift throughout hospital stay. Outcome: Progressing Towards Goal  Compliant with all medications     Problem: Manic Behavior (Adult/Pediatric)  Goal: *STG: Maintains appropriate boundaries  Patient will adhere to and verbalize understanding regarding the importance of appropriate boundaries every shift throughout hospital stay. Outcome: Progressing Towards Goal  Maintaining appropriate boundries   Goal: *STG: Demonstrates improvement in sleep pattern  Patient will have adequate sleep with at least 8 hours of sleep throughout the night with less than one awakening period nightly by day 3 of admission. Outcome: Progressing Towards Goal  Slept well last night     Comments:   Pt is pleasant and cooperative.  She is compliant with medications and groups talking to staff participating in all aspects of treatment plan. Pt denies hallucinations and any thoughts of harming self or others. She is talkative but denies racing thoughts and states she feels like her normal self. She is not expressing any delusional thoughts. She states her goal for today is to participate in groups and be back on [address] to be around people because she is lonely and board. Pt states she was brought to the hospital because she went off at her doctor's appointment. She also stated that she and her mother clash at times because her mother is also bipolar. She states that now she and her mother are getting along well and her mother is bringing her snacks and everything else she needs.

## 2017-07-11 NOTE — BSMART NOTE
Pt ate 100 percent of here dinner tray. Pt went outside for fresh air. Pt participated in the recreation group. Pt is cooperative during approach. Pt play games with peers and staffs. Pt watched TV. Pt mother, father and sister visited her during shift. Everything seem to go well. Pt played games with sister. Pt denied any self harm and suicidal ideation during shift. Will continue to monitor pt for safety and location.

## 2017-07-11 NOTE — BH NOTES
GROUP THERAPY PROGRESS NOTE    Billy Teresa is participating in Positive thoughts. Group time: 30 min    Personal goal for participation: Being able to stay focus and have a positive outlook avoiding negativity.     Goal orientation: personal    Group therapy participation: active    Therapeutic interventions reviewed and discussed: What idea to create positive thoughts    Impression of participation: Pt actively participated in group activity

## 2017-07-11 NOTE — PROGRESS NOTES
Psychiatric Progress Note    Date: 17  Patient Name: Caron Cox  : 1990  MRN: [id number]  Hospital Day: 5      INTERVAL HISTORY:   Patient is 28 yo  female with bipolar 1 disorder in manic state. Remains on 1:1 for hx of outward sexual behaviors on unit and for safety from other hypersexual patients. Not suicidal/ homicidal at this time. Very talkative, interrupting of this provider, tangential thought process and expansive mood appreciated. Limited insight. REVIEW OF SYSTEMS: reviewed 10 organ systems as negative, wnl    MENTAL STATUS EXAM:  Orientation: oriented to person, place, time, and situation  Appearance: Dressed in casual clothes with fair grooming and hygeine  Behavior: hyperactive, fidgety  Motor: +psychomotor agitation  Speech: hyperverbal, normal volume/ tone  Mood: \"just great\"  Affect: slightly elevated  Thought Process: tangential, mostly linear  Thought Content: Denies SI and HI  Perception: Denies AH or VH  Concentration: fair  Memory: fair  Cognition: Alert and oriented  Insight: Fair  Judgment: Fair    RISK ASSESSMENT:   Prior Attempts: no noted prior  Lethality of Attempts: none noted prior  Current Ideation/Plan: denies  Protective Factors: limited, no supportive family or peer support  Future Orientation: limited    ASSESSMENT: Patient is 28 yo  female with bipolar 1 disorder in manic state. Remains hypomanic, though sleeping more and with less hyperactive behaviors, still hyperverbal and tangential and elev mood. Patient would benefit from continued inpatient psychiatric admission for safety, stabilization, medication management, and discharge planning. Diagnoses:  Bipolar 1 disorder, mre manic  Nicotine use disorder  Alcohol use disorder      Plan:  1. Continue with inpatient psychiatric treatment for containment, stabilization and medication management  2. Continue with suicide or assault precautions  3.  Patient is to continue with Art/OT and family therapy sessions  4. Will need to talk with outpatient providers for more collateral  5. Will need to talk with family for more collateral  6. Medications:  - incr lithium 600 mg po bid (Li level 0.68)  - continue seroquel xr 200 mg po nightly  - continue risperdal 3 mg po bid  7. Labs: reviewed, wnl  8. SW to help with disposition  9.  ELOS 5-7 days        Nikkie Lara MD

## 2017-07-11 NOTE — BSMART NOTE
OCCUPATIONAL THERAPY PROGRESS NOTE    Group Time:  8818  Attendance: The patient attended full group. .  The patient left and returned to activity at least once. Participation:  The patient participated fully in the activity. Attention:  The patient needed redirection to activity at least once. Interaction:  The patient frequently interacts with others. Participated as asked. Much focus remains on her children. Talkative, but, not pressured or interrupting. Mood expansive at times.

## 2017-07-11 NOTE — BH NOTES
FREDRICK Collateral:  SW talked to pt.'s mother to discuss d/c planning. Pt.'s mother expressed concerns about pt. .  The mother stated she feels as though pt con benefit from continued treatment. The mother  reflected don pt.s ; recent hospitalization prior to this admission. The mother feels as though the pt.'s mother feels as though the pt. Was d/c too soon. The mother stated pt was able to stable on Depakote for 3 years. The mother expressed she is hoping pt. Will continue to stay here for treatment. SW provided the mother with an update on pt.'s care. The mother stated once pt is stable and at baseline , pt can ret run to her home. FREDRICK discussed case with the treating psychiatrist.  Nanci Faria addressed the mother's concerns     SW Contact: Pt. Is a 27year old female mother of two with history of Bipolar disorder and history of past psych hospitalizations. .  Pt. Was admitted to this facility for manic behaviors and medication non-compliance. FREDRICK met with the pt. And treating psychiatrist to discuss d/c planning. The pt. Expressed the desire to go home. Pt. Stated she feels as though she is better. The treating psychiatrist provided pt with mental health eduction. The team discussed positive coping skills. The pt. Appeared tangential , a little hyper verbal and irritable. [id number] [facility] assessment on 7/12/17 @ 2:00 on the pt.

## 2017-07-11 NOTE — BH NOTES
Pt slept about 2 and a half hours. Pt drank multiple cups of water, which made her make continues trips to bathroom  Pt was told to wear her skid proof socks to prevent slipping, will continue to monitor per protocol.

## 2017-07-11 NOTE — BH NOTES
Patient stated that she was having anxiety, and that her feet were hurting patient given Vistaril for anxiety and Motrin for bilateral feet pain will continue to monitor.

## 2017-07-11 NOTE — BH NOTES
Patient c/o not being able to sleep and her thoughts were racing patient given Trazodone for insomnia and Haldol for psychosis will continue to monitor.

## 2017-07-11 NOTE — BH NOTES
Patient ate breakfast and lunch. Patient took morning medications. Patient went outside for group. Patient took a shower. Patient attend activity group. Patient interacted with other other patients. Patient appeared to have some anxiety due to male peer who was disruptive verbally and physically. Patient took medications for anxiety. Patient appear to be calm after medications. Patient involved in no falls this shift, Skid proof footwear utilized. Patient is still 1:1 for safety. Patient is safe on the unit.

## 2017-07-11 NOTE — BH NOTES
GROUP THERAPY PROGRESS NOTE    Taiwo Freitas is participating in Recreational Therapy.      Group time: 30 minutes    Personal goal for participation: outside played various games    Goal orientation: relaxation    Group therapy participation: active    Therapeutic interventions reviewed and discussed:     Impression of participation:

## 2017-07-12 PROCEDURE — 74011250637 HC RX REV CODE- 250/637: Performed by: PSYCHIATRY & NEUROLOGY

## 2017-07-12 PROCEDURE — 65220000003 HC RM SEMIPRIVATE PSYCH

## 2017-07-12 PROCEDURE — 74011250637 HC RX REV CODE- 250/637: Performed by: STUDENT IN AN ORGANIZED HEALTH CARE EDUCATION/TRAINING PROGRAM

## 2017-07-12 RX ADMIN — LITHIUM CARBONATE 600 MG: 300 TABLET ORAL at 08:14

## 2017-07-12 RX ADMIN — RISPERIDONE 3 MG: 1 TABLET ORAL at 08:14

## 2017-07-12 RX ADMIN — IBUPROFEN 400 MG: 400 TABLET ORAL at 20:38

## 2017-07-12 RX ADMIN — HYDROXYZINE PAMOATE 50 MG: 25 CAPSULE ORAL at 10:43

## 2017-07-12 RX ADMIN — HALOPERIDOL 5 MG: 5 TABLET ORAL at 02:23

## 2017-07-12 RX ADMIN — HYDROXYZINE PAMOATE 50 MG: 25 CAPSULE ORAL at 16:06

## 2017-07-12 RX ADMIN — RISPERIDONE 3 MG: 1 TABLET ORAL at 20:07

## 2017-07-12 RX ADMIN — QUETIAPINE FUMARATE 300 MG: 200 TABLET ORAL at 20:07

## 2017-07-12 RX ADMIN — LITHIUM CARBONATE 600 MG: 300 TABLET ORAL at 20:07

## 2017-07-12 RX ADMIN — IBUPROFEN 400 MG: 400 TABLET ORAL at 02:16

## 2017-07-12 RX ADMIN — IBUPROFEN 400 MG: 400 TABLET ORAL at 13:27

## 2017-07-12 NOTE — PROGRESS NOTES
Psychiatric Progress Note    Date: 17  Patient Name: Geena Murphy  : 1990  MRN: [id number]  Hospital Day: 6      INTERVAL HISTORY:   Patient is 28 yo  female with bipolar 1 disorder in manic state. Pt pleasant, cooperative, paces around unit, frequently moving, hyperverbal, good mood, admits to feeling more \"calm\" from increase in lithium. C/o requiring haldol at night to sleep. Discussed increasing seroquel to help with nighttime sleep. Denies AH/VH's. Not suicidal/ homicidal at this time. REVIEW OF SYSTEMS: reviewed 10 organ systems as negative, wnl    MENTAL STATUS EXAM:  Orientation: oriented to person, place, time, and situation  Appearance: Dressed in casual clothes with fair grooming and hygeine  Behavior: hyperactive, fidgety  Motor: +psychomotor agitation, paces around room and the unit  Speech: conversational, normal volume/ tone  Mood: \"good\"  Affect: euthymic  Thought Process:  mostly linear  Thought Content: Denies SI and HI  Perception: Denies AH or VH  Concentration: fair  Memory: fair  Cognition: Alert and oriented  Insight: Fair  Judgment: Fair    RISK ASSESSMENT:   Prior Attempts: no noted prior  Lethality of Attempts: none noted prior  Current Ideation/Plan: denies  Protective Factors: limited, no supportive family or peer support  Future Orientation: limited    ASSESSMENT: Patient is 28 yo  female with bipolar 1 disorder in manic state. Remains hypomanic, though sleeping more and with less hyperactive behaviors, still hyperverbal and tangential and elev mood. Patient would benefit from continued inpatient psychiatric admission for safety, stabilization, medication management, and discharge planning. Diagnoses:  Bipolar 1 disorder, mre manic  Nicotine use disorder  Alcohol use disorder      Plan:  1. Continue with inpatient psychiatric treatment for containment, stabilization and medication management  *D/c 1:1, to observe patient in milieu.   2. Continue with suicide or assault precautions  3. Patient is to continue with Art/OT and family therapy sessions  4. Will need to talk with outpatient providers for more collateral  5. Will need to talk with family for more collateral  6. Medications:  - continue lithium 600 mg po bid (Li level 0.68)  - incr seroquel xr to 300 mg po nightly  - continue risperdal 3 mg po bid  7. Labs: reviewed, wnl  8. SW to help with disposition  9.  ELOS 5-7 days        Mark La MD

## 2017-07-12 NOTE — BSMART NOTE
OCCUPATIONAL THERAPY PROGRESS NOTE  Group Time:  1864  Attendance: The patient attended full group. The patient left and returned to activity at least 4 times. Participation:  The patient participated with moderate elaboration in the activity. Attention:  The patient needed frequent redirection to activity. Interaction:  The patient occasionally  interacts with others. Continues restless and unable to sit still for long, did make almost 15 minutes at once during group. Answers generally on subject, again often mentions her children.

## 2017-07-12 NOTE — PROGRESS NOTES
Patient c/o feeling anxious; patient given Vistaril 50 mg po for anxiety; will continue to monitor and maintain safe environment.

## 2017-07-12 NOTE — PROGRESS NOTES
conducted an initial consultation and Spiritual Assessment for Kassidy Pedro, who is a 27 y.o.,female. Patients Primary Language is: [place]. According to the patients EMR Moravian Affiliation is: Unknown. The reason the Patient came to the hospital is:   Patient Active Problem List    Diagnosis Date Noted    Bipolar disorder, curr episode mixed, severe, with psychotic features ([facility] 07/07/2017    Poor dentition 03/19/2015    Dermoid cyst 11/21/2014    Bipolar 2 disorder ([facility] 09/16/2013    Tobacco use 09/16/2013        The  provided the following Interventions:  Initiated a relationship of care and support. Explored issues of reba, belief, spirituality and Presybeterian/ritual needs while hospitalized. Listened empathically as patient shared. Provided chaplaincy education. Provided information about Spiritual Care Services. Offered prayer and assurance of continued prayers on patient's behalf. Chart reviewed. The following outcomes where achieved:  Patient shared limited information about both their medical narrative and spiritual journey/beliefs. Patient processed feeling about current hospitalization. Patient expressed gratitude for 's visit. Plan:  Chaplains will continue to follow and will provide pastoral care on an as needed/requested basis.  recommends bedside caregivers page  on duty if patient shows signs of acute spiritual or emotional distress.       [address]  [phone]

## 2017-07-12 NOTE — BH NOTES
SW discussed case along with treating psychiatrist in treatment team meeting today. FREDRICK Contact: Pt. Is a 27year old female mother of two with history of Bipolar disorder and history of past psych hospitalizations. Pt. Was admitted to this facility for manic behaviors and medication non-compliance. SW met with the pt. to discuss d/c planning. Pt. stated she was feeling better. Pt stated she does not feel maniac anymore. Pt. expressed she is glad to galindo the unit with more people. SW discussed continued medication and treatment compliance. SW also discussed  positive coping skills, safety plan and conflict resolution. Pt. denies ideations and hallucinations. Pt. Is more organized, less manic and less anxious. [address] [place] [address] PACT team plans to have an intake assessment today @ 2:00 on the pt. Pt. plans to return home at [place]. FREDRICK contacted staff at [address] [place] [address] @ [phone] and [phone] to obtain an aftercare appointment.

## 2017-07-12 NOTE — BH NOTES
GROUP THERAPY PROGRESS NOTE    Radha Meade is participating in Recreational Therapy.      Group time: 30 minutes    Personal goal for participation:  coloring sheets     Goal orientation: community    Group therapy participation: active    Therapeutic interventions reviewed and discussed:     Impression of participation:

## 2017-07-12 NOTE — BSMART NOTE
ART THERAPY GROUP PROGRESS NOTE    PATIENT SCHEDULED FOR GROUP AT: 14:15    ATTENDANCE: 1/4    PARTICIPATION LEVEL: Participates fully in the art process    ATTENTION LEVEL: Able to focus on task    FOCUS: Goals    SYMBOLIC & THEMATIC CONTENT AS NOTED IN IMAGERY: She joined the last 1/4 of group during group discussion. She shared that her main goal is to work on controlling and managing her anger. Group discussed the importance of taking responsibility for one's actions and thinking before acting.

## 2017-07-12 NOTE — PROGRESS NOTES
Problem: Falls - Risk of  Goal: *Absence of falls  Patient will remain free of falls every shift throughout hospital stay. Outcome: Progressing Towards Goal  No falls     Problem: Psychosis  Goal: *STG: Decreased hallucinations  Patient will verbalize denial of auditory/visual hallucinations every shift throughout hospital stay. Outcome: Progressing Towards Goal  Denies   Goal: *STG: Decreased delusional thinking  Patient will verbalize a decrease or be free of delusional thoughts every shift throughout hospital stay. Outcome: Progressing Towards Goal  Pt is not expressing any delusions   Goal: *STG: Remains safe in hospital  Patient will remain free of harm to self and others every shift throughout hospitalization. Outcome: Progressing Towards Goal  No physical aggression   Goal: *STG: Participates in individual and group therapy  Patient will attend at least 50% or 2 of 4 groups daily throughout hospital stay. .   Outcome: Progressing Towards Goal  Participating in groups   Goal: *STG/LTG: Complies with medication therapy  Patient will take medication as prescribed every shift throughout hospital stay. Outcome: Progressing Towards Goal  Compliant     Problem: Manic Behavior (Adult/Pediatric)  Goal: *STG: Maintains appropriate boundaries  Patient will adhere to and verbalize understanding regarding the importance of appropriate boundaries every shift throughout hospital stay. Outcome: Progressing Towards Goal  Pt is maintaining appropriate boundaries and verbalizes the importance appropriate boundaries keeping her safe in and out of the hospital   Goal: *STG: Demonstrates improvement in sleep pattern  Patient will have adequate sleep with at least 8 hours of sleep throughout the night with less than one awakening period nightly by day 3 of admission. Outcome: Progressing Towards Goal  Sleeping through the night     Comments:   Pt is pleasant and cooperative.  She remains somewhat manic restless and anxious at times. Pt took vistaril 50 mg PO for anxiety. She stated that being on STU2 made her anxious because she felt so isolated. Pt agrees to appropriate boundaries and verbalized that will keep her safe. She states she is doing better and denies any thoughts of harming self or others. She is not expressing any delusional thoughts at this time. Pt is interacting frequently with staff. She attends groups but does not always stay for the entire group. She is compliant with all medications.

## 2017-07-12 NOTE — PROGRESS NOTES
conducted an initial consultation and Spiritual Assessment for Emilio Martel, who is a 27 y.o.,female. Patients Primary Language is: [place]. According to the patients EMR Jehovah's witness Affiliation is: Unknown. The reason the Patient came to the hospital is:   Patient Active Problem List    Diagnosis Date Noted    Bipolar disorder, curr episode mixed, severe, with psychotic features ([facility] 07/07/2017    Poor dentition 03/19/2015    Dermoid cyst 11/21/2014    Bipolar 2 disorder ([facility] 09/16/2013    Tobacco use 09/16/2013        The  provided the following Interventions:  Initiated a relationship of care and support. Explored issues of reba, belief, spirituality. Listened empathically. Offered prayer on patient's behalf. The following outcomes where achieved:  Patient shared limited information about both their medical narrative and spiritual journey/beliefs. Patient processed feelings. Patient expressed gratitude for 's visit. Assessment:  Patient does not have any Jewish/cultural needs that will affect patients preferences in health care. There are no spiritual or Jewish issues which require intervention at this time. Plan:  Chaplains will continue to follow and will provide pastoral care on an as needed/requested basis.  recommends bedside caregivers page  on duty if patient shows signs of acute spiritual or emotional distress. Era Sampson.  [employer] [phone]

## 2017-07-12 NOTE — BH NOTES
Patient slept three hours this shift. Patient was up several times during the night asking this writer the time. Patient drank several cups of water. Patient is currently sitting in day area watching television. Patient remains on 1:1. Will continue to monitor.

## 2017-07-12 NOTE — BH NOTES
Patient ate dinner and interacted with other patients. Patient attend groups. Patient appeared to be calm after taking medication for anxiety. Patient had visitors today her mother and friend. Patient had a snack. Patient took her nighttime medications. Patient involved in no falls this shift, Skid proof footwear utilized. Patient is safe on the unit.

## 2017-07-13 VITALS
HEIGHT: 64 IN | SYSTOLIC BLOOD PRESSURE: 104 MMHG | RESPIRATION RATE: 16 BRPM | HEART RATE: 67 BPM | WEIGHT: 180 LBS | BODY MASS INDEX: 30.73 KG/M2 | DIASTOLIC BLOOD PRESSURE: 68 MMHG | TEMPERATURE: 97.8 F

## 2017-07-13 PROCEDURE — 74011250637 HC RX REV CODE- 250/637: Performed by: STUDENT IN AN ORGANIZED HEALTH CARE EDUCATION/TRAINING PROGRAM

## 2017-07-13 PROCEDURE — 74011250637 HC RX REV CODE- 250/637: Performed by: PSYCHIATRY & NEUROLOGY

## 2017-07-13 RX ORDER — RISPERIDONE 3 MG/1
3 TABLET, FILM COATED ORAL 2 TIMES DAILY
Qty: 28 TAB | Refills: 0 | Status: SHIPPED | OUTPATIENT
Start: 2017-07-13 | End: 2017-09-08 | Stop reason: ALTCHOICE

## 2017-07-13 RX ORDER — LITHIUM CARBONATE 300 MG
600 TABLET ORAL 2 TIMES DAILY
Qty: 28 TAB | Refills: 0 | Status: SHIPPED | OUTPATIENT
Start: 2017-07-13

## 2017-07-13 RX ORDER — QUETIAPINE FUMARATE 300 MG/1
300 TABLET, FILM COATED ORAL
Qty: 14 TAB | Refills: 0 | Status: SHIPPED | OUTPATIENT
Start: 2017-07-13 | End: 2017-09-08 | Stop reason: ALTCHOICE

## 2017-07-13 RX ADMIN — LITHIUM CARBONATE 600 MG: 300 TABLET ORAL at 08:01

## 2017-07-13 RX ADMIN — RISPERIDONE 3 MG: 1 TABLET ORAL at 08:01

## 2017-07-13 RX ADMIN — IBUPROFEN 400 MG: 400 TABLET ORAL at 01:06

## 2017-07-13 RX ADMIN — IBUPROFEN 400 MG: 400 TABLET ORAL at 10:31

## 2017-07-13 RX ADMIN — HYDROXYZINE PAMOATE 50 MG: 25 CAPSULE ORAL at 02:14

## 2017-07-13 NOTE — BH NOTES
FREDRICK Contact: Pt. Is a 27year old female mother of two with history of Bipolar disorder and history of past psych hospitalizations. Pt. Was admitted to this facility for manic behaviors and medication non-compliance. FREDRICK met with the pt. to discuss d/c plan. Pt. expressed she is ready for d/c.   FREDRICK encouraged continued medication compliance . FREDRICK also discussed positive coping skills. Pt. denies ideations and hallucinations. FREDRICK confirmed pt, completed an intake assessment with [facility]. Pt. plans to return home at [place].  Pt.s father will be picking the pt. Up.    FREDRICK Collateral: FREDRICK contacted Dr. Robe Ortega @ [phone] regarding pt. s aftercare appointment. SW was referred to  [facility] PACT supervisor. Mrs. Sixto Ibarra was out of the office today. Mrs. Sixto Ibarra referred SW to Mrs. Alford @ [phone]. [phone]. FREDRICK left a message for Mrs. Alford regarding pt. s appointments. Pt. has an appointment to see Dr. Karly Bustamante on 7/24/17 @ 1:00 . FREDRICK discussed the case with the treating psychiatrist    FREDRICK Collateral:  Lola Maharaj staff at [facility] contacted this FREDRICK with an earlier appointment for the pt. The pt. Will be seen on 7/14/17 @ 11:30 a.m with Dr. Katherine Browne. FREDRICK informed the unit staff regarding pt.'s new appointment time.

## 2017-07-13 NOTE — BH NOTES
Patient has been discharged to self and will follow up with [phone]. Patient's scripts have been sent to pharmacy where patient will pick her medications upat. Patient has been provided with information regarding mental health follow up care with follow up appointment in place. Patient has been educated regarding seeking additional support if needed with information listed on discharge paper work and emergency card provided. Patient has been escorted off unit to awaiting transportation (mother) for transport to home.

## 2017-07-13 NOTE — DISCHARGE INSTRUCTIONS
BEHAVIORAL HEALTH NURSING DISCHARGE NOTE      Emergency Numbers    : [facility] Emergency Services: [phone]  Suicide Prevention Line: 5 [phone] (TALK)      The following personal items collected during your admission are returned to you:   Dental Appliance: Dental Appliances: None  Vision: Visual Aid: Contacts  Hearing Aid:    Jewelry: Jewelry: Bracelet, Earrings, Necklace, Ring (Security Safe)  Clothing: Clothing: Shorts, Shirt, Undergarments, Other (comment) (flip flops)  Other Valuables: Other Valuables:  (Sun glasses)  Valuables sent to safe: Personal Items Sent to Safe:  Dharmesh Muscat w/ I.D;Jewelry(2-rings;ear rings;bracelet;chain)lhtr)        The discharge information has been reviewed with the patient. The patient verbalized understanding. [employer] Activation    Thank you for requesting access to [employer]. Please follow the instructions below to securely access and download your online medical record. [employer] allows you to send messages to your doctor, view your test results, renew your prescriptions, schedule appointments, and more. How Do I Sign Up? 1. In your internet browser, go to [email or web address]  2. Click on the First Time User? Click Here link in the Sign In box. You will be redirect to the New Member Sign Up page. 3. Enter your [employer] Access Code exactly as it appears below. You will not need to use this code after youve completed the sign-up process. If you do not sign up before the expiration date, you must request a new code. [employer] Access Code: Activation code not generated  Current [employer] Status: Active (This is the date your [employer] access code will )    4. Enter the last four digits of your Social Security Number (xxxx) and Date of Birth (mm/dd/yyyy) as indicated and click Submit. You will be taken to the next sign-up page. 5. Create a [employer] ID.  This will be your [employer] login ID and cannot be changed, so think of one that is secure and easy to remember. 6. Create a [employer] password. You can change your password at any time. 7. Enter your Password Reset Question and Answer. This can be used at a later time if you forget your password. 8. Enter your e-mail address. You will receive e-mail notification when new information is available in [address] 9. Click Sign Up. You can now view and download portions of your medical record. 10. Click the Download Summary menu link to download a portable copy of your medical information. Additional Information    If you have questions, please visit the Frequently Asked Questions section of the [employer] website at [email or web address] Remember, [employer] is NOT to be used for urgent needs. For medical emergencies, dial 911.     Patient armband removed and shredded

## 2017-07-13 NOTE — BH NOTES
GROUP THERAPY PROGRESS NOTE    Billy Brii is participating in [employer].      Group time: 30 minutes    Personal goal for participation: use the phone    Goal orientation: community    Group therapy participation: active    Therapeutic interventions reviewed and discussed: goals and procedures    Impression of participation: encouraged

## 2017-07-14 NOTE — DISCHARGE SUMMARY
Psychiatric Discharge Summary    Date: 17   Patient Name: Zbigniew Dozire  : 10/24/1961  MRN: [id number]  Admission Date: 2017  Discharge Date:     HISTORY OF PRESENT ILLNESS:  The patient is a 56-year-old   female with a history of bipolar 1 disorder, most recent  episode manic, who presented under temporary detainment order for  inpatient psychiatric hospitalization after presenting with labile mood,  pressured speech, delusional thinking, and 2 day history of  sleeplessness. Of note, this patient was referred from [facility].  Furthermore, this patient was recently discharged from [facility] on  2017 where she was stabilized on a regimen of lithium 300 mg  q.a.m., lithium 600 mg q.p.m., Seroquel 100 mg nightly, and Risperdal  3 mg twice daily. Collateral information reported that the patient  engages in sexual activity when manic. The patient has engaged  in sexual acts at prior hospitalizations per mother.     On initial assessment, the patient reported ongoing conflict with her  mother. She states that although she was recently discharged from  acute stabilization, her mother persists to have her hospitalized for  ongoing concerns of her horacio. The patient stated that she was  compliant on her medicines since discharge, however, collateral states  that she has not been taking her medications.     The patient was unhelpful in giving clarity on the history of her bipolar  symptoms. She currently denies any suicidal ideations, homicidal  ideations, auditory hallucinations, and visual hallucinations. She further  denies any depression or anxiety. The patient does admit to symptoms  of irritability. She is unable to provide a time course of these  Symptoms    HOSPITAL COURSE:   Patient was restarted on home meds of lithium 300 mg po bid, seroquel xr 200 mg po qhs, and risperdal 3 mg po bid. Lithium level was checked and was 0.68. All other medical labs were within normal limits. Seroquel xr was increased from 200--> 300 mg po qhs. After 3 days and patient still acutely manic, lithium increased to 600 mg po bid with good relief. Risperdal was kept the same at 3 mg po bid. Patient was initially placed on 1:1 observation based on history of sexual acting out behaviors while on psych unit. 1:1 was discontinued after clinical observation that she was redirectable and not hypersexual at this time. On day of discharge, patient was calm, cooperative, speaking clearly and communicating well. Stable. Not acutely manic. Not suicidal/ homicidal.      MENTAL STATUS EXAM:  Orientation: oriented to person, place, time, and situation  Appearance: Dressed in hospital gown with fair grooming and hygeine  Behavior: Cooperative with good eye contact  Motor: No psychomotor agitation/retardation  Speech: Normal rate, tone and volume  Mood: \"good\"  Affect: full, bright  Thought Process: linear, goal-directed  Thought Content: Denies SI and HI  Perception: Denies AH or VH  Concentration: fair  Memory: fair  Cognition: Alert and oriented  Insight: fair  Judgment: fair    ASSESSMENT at time of discharge:   Patient is 26 yo  female with bipolar 1 disorder in manic state. Stable. Not psychotic. Not suicidal/ homicidal. No longer acute harm to self or others.     Diagnoses:  Bipolar 1 disorder, mre manic  Nicotine use disorder  Alcohol use disorder      Discharge Instructions:  1. Continue psychiatric medications of lithium 600 mg po bid, seroquel xr 300 mg po nightly, risperdal 3 mg po bid  2. Please have your psychiatrist recheck a lithium level this week. 3. Please make all follow up appointments with doctors and , as provided by inpatient behavioral health . 4. If you feel unsafe or begin experiencing suicidal thoughts again, please call 9-1-1 or return to the nearest emergency department. Disposition:  Home with outpatient follow-up      Cary Hinds MD

## 2017-09-08 ENCOUNTER — OFFICE VISIT (OUTPATIENT)
Dept: FAMILY MEDICINE CLINIC | Age: 27
End: 2017-09-08

## 2017-09-08 VITALS
SYSTOLIC BLOOD PRESSURE: 100 MMHG | HEIGHT: 64 IN | RESPIRATION RATE: 16 BRPM | WEIGHT: 188.4 LBS | BODY MASS INDEX: 32.17 KG/M2 | HEART RATE: 90 BPM | DIASTOLIC BLOOD PRESSURE: 60 MMHG | OXYGEN SATURATION: 98 % | TEMPERATURE: 98.1 F

## 2017-09-08 DIAGNOSIS — Z00.00 WELL WOMAN EXAM (NO GYNECOLOGICAL EXAM): Primary | ICD-10-CM

## 2017-09-08 DIAGNOSIS — F17.200 SMOKING: ICD-10-CM

## 2017-09-08 DIAGNOSIS — F31.81 BIPOLAR 2 DISORDER (HCC): ICD-10-CM

## 2017-09-08 RX ORDER — DIVALPROEX SODIUM 500 MG/1
2000 TABLET, EXTENDED RELEASE ORAL
Refills: 2 | COMMUNITY
Start: 2017-06-05

## 2017-09-08 RX ORDER — CLONAZEPAM 0.5 MG/1
TABLET ORAL
COMMUNITY
End: 2021-06-22

## 2017-09-08 NOTE — PROGRESS NOTES
1. Have you been to the ER, urgent care clinic since your last visit? Hospitalized since your last visit? No    2. Have you seen or consulted any other health care providers outside of the [address] [place] [address] since your last visit? Include any pap smears or colon screening. No    Patient presents for annual pap smear. Last pap 2 weeks ago - [facility] 1 - normal exam as per patient. Abnormal Pap smears No.  Procedures if indicated No.Form of contraception None. Mammogram No. Family history of breast CA No, colon CA No, cervical CA No.Tetanus 2007. Patient has not had flu vaccine. Also, she is not sure if she had a pneumonia vaccine.

## 2017-09-08 NOTE — PROGRESS NOTES
Subjective:   27 y.o. female for Well Woman Check. Her gyne and breast care is done elsewhere by her Ob-Gyne physician at [facility]. Will obtain records. Recently had breast exam and pap smear 2 wks ago. Per her she had normal result. Will obtain records. Does on and off self breast exam. No concern. Did not have menstrual period since last 3 year.s following ob/gyn. Said she had recently urine pregnancy done at ob/gyn and was told she is not pregnant. Also going for ultrasound for further work up on 12th sept. Denies any concern. Sexually active with one partner her boy friend and using protection. Denies any h/o STD or any prior abnormal pap smear. No pelvic pain or vaginal discharge. Noted from the chart. Recently hospitalized for psychosis and medication adjustment back in July. Currently symptomatically stable on current medication. Review labs done back in July. Lab Results   Component Value Date/Time    Lithium level 0.68 07/09/2017 06:40 AM    Sodium 140 07/09/2017 06:40 AM    Creatinine 0.74 07/09/2017 06:40 AM    TSH 2.47 07/09/2017 06:40 AM    WBC 8.8 07/09/2017 06:40 AM     Lab Results   Component Value Date/Time    WBC 8.8 07/09/2017 06:40 AM    HGB 12.8 07/09/2017 06:40 AM    HCT 38.5 07/09/2017 06:40 AM    PLATELET 898 91/52/4736 06:40 AM    MCV 95.5 07/09/2017 06:40 AM     Lab Results   Component Value Date/Time    Sodium 140 07/09/2017 06:40 AM    Potassium 4.1 07/09/2017 06:40 AM    Chloride 106 07/09/2017 06:40 AM    CO2 29 07/09/2017 06:40 AM    Anion gap 5 07/09/2017 06:40 AM    Glucose 96 07/09/2017 06:40 AM    BUN 9 07/09/2017 06:40 AM    Creatinine 0.74 07/09/2017 06:40 AM    BUN/Creatinine ratio 12 07/09/2017 06:40 AM    GFR est AA >60 07/09/2017 06:40 AM    GFR est non-AA >60 07/09/2017 06:40 AM    Calcium 8.9 07/09/2017 06:40 AM    Bilirubin, total 0.3 07/09/2017 06:40 AM    AST (SGOT) 15 07/09/2017 06:40 AM    Alk.  phosphatase 76 07/09/2017 06:40 AM    Protein, total 7.0 2017 06:40 AM    Albumin 3.6 2017 06:40 AM    Globulin 3.4 2017 06:40 AM    A-G Ratio 1.1 2017 06:40 AM    ALT (SGPT) 36 2017 06:40 AM     Lab Results   Component Value Date/Time    TSH 2.47 2017 06:40 AM     Lab Results   Component Value Date/Time    Hemoglobin A1c 5.3 2017 06:40 AM     Lab Results   Component Value Date/Time    Cholesterol, total 143 2017 06:40 AM    HDL Cholesterol 46 2017 06:40 AM    LDL, calculated 82.6 2017 06:40 AM    VLDL, calculated 14.4 2017 06:40 AM    Triglyceride 72 2017 06:40 AM    CHOL/HDL Ratio 3.1 2017 06:40 AM       Patient Active Problem List    Diagnosis Date Noted    Bipolar disorder, curr episode mixed, severe, with psychotic features ([facility] 2017    Poor dentition 2015    Dermoid cyst 2014    Bipolar 2 disorder ([facility] 2013    Tobacco use 2013     Current Outpatient Prescriptions   Medication Sig Dispense Refill    divalproex ER (DEPAKOTE ER) 500 mg ER tablet Take 500 mg by mouth two (2) times a day. 2    clonazePAM (KLONOPIN) 0.5 mg tablet Take  by mouth.  OTHER       lithium carbonate 300 mg tablet Take 2 Tabs by mouth two (2) times a day. Indications: BIPOLAR DISORDER (Patient taking differently: Take 900 mg by mouth nightly.  Indications: BIPOLAR DISORDER) 28 Tab 0     No Known Allergies  Past Medical History:   Diagnosis Date    Bipolar depression ([facility]     Desmoid tumor 2014    for excision     Past Surgical History:   Procedure Laterality Date    HX  SECTION  2009     vaginal birth then c section     Family History   Problem Relation Age of Onset    Asthma Mother     Hypertension Father     Elevated Lipids Father     Diabetes Father     Heart Disease Father      Social History   Substance Use Topics    Smoking status: Current Every Day Smoker     Packs/day: 1.00     Years: 9.00     Types: Cigarettes    Smokeless tobacco: Never Used    Alcohol use No      Comment: 1 x per week (socially)          ROS: Feeling generally well. No TIA's or unusual headaches, no dysphagia. No prolonged cough. No dyspnea or chest pain on exertion. No abdominal pain, change in bowel habits, black or bloody stools. No urinary tract symptoms. No new or unusual musculoskeletal symptoms. Objective: The patient appears well, alert, oriented x 3, in no distress. Visit Vitals    /60 (BP 1 Location: Left arm, BP Patient Position: Sitting)    Pulse 90    Temp 98.1 °F (36.7 °C) (Oral)    Resp 16    Ht 5' 4.25\" (1.632 m)    Wt 188 lb 6.4 oz (85.5 kg)    SpO2 98%    BMI 32.09 kg/m2     ENT normal.  Neck supple. No adenopathy or thyromegaly. KHADIJAH. Lungs are clear, good air entry, no wheezes, rhonchi or rales. S1 and S2 normal, no murmurs, regular rate and rhythm. Abdomen soft without tenderness, guarding, mass or organomegaly. Extremities show no edema, normal peripheral pulses. Neurological is normal, no focal findings. Breast and Pelvic exams are deferred. Assessment/Plan:       ICD-10-CM ICD-9-CM    1. Well woman exam (no gynecological exam) Z00.00 V70.0     [V70.0]   2. Bipolar 2 disorder [facility]): following psychiatrist. See HPI. Recently had medication adjustment. F31.81 296.89    3. Smoking: in process to quit on her own. Was not smoking during hospitalization. Started back. Will ask for help if needed. F17.200 305.1    4. Amenorrhea: following ob/gyn. Scheduled for an ultrasound on 12th of this month for furhter work up. Also urine pregnancy test was negative per patient 2 wks ago at ob.gyn will obtain records. Pt understood and agrees with above plan. Refused all immunization offered today. See HM   Follow-up Disposition:  Return in about 3 months (around 12/8/2017).

## 2017-09-08 NOTE — MR AVS SNAPSHOT
Visit Information Date & Time Provider Department Dept. Phone Encounter #  
 9/8/2017  9:15 AM Guille [facility] [phone] [id number] Follow-up Instructions Return in about 3 months (around 12/8/2017). Upcoming Health Maintenance Date Due  
 PAP AKA CERVICAL CYTOLOGY 8/21/2020 DTaP/Tdap/Td series (3 - Td) 9/8/2027 Allergies as of 9/8/2017  Review Complete On: 9/8/2017 By: Madelin Sun No Known Allergies Current Immunizations  Reviewed on 9/1/2015 Name Date Influenza Vaccine Junior Kappa) 9/1/2015 Tdap 1/1/2007 Not reviewed this visit You Were Diagnosed With   
  
 Codes Comments Well woman exam (no gynecological exam)    -  Primary ICD-10-CM: Z00.00 ICD-9-CM: V70.0 [V70.0] Bipolar 2 disorder (HCC)     ICD-10-CM: F31.81 
ICD-9-CM: 296.89 Smoking     ICD-10-CM: F17.200 ICD-9-CM: 305.1 Vitals BP Pulse Temp Resp Height(growth percentile) Weight(growth percentile) 100/60 (BP 1 Location: Left arm, BP Patient Position: Sitting) 90 98.1 °F (36.7 °C) (Oral) 16 5' 4.25\" (1.632 m) 188 lb 6.4 oz (85.5 kg) SpO2 BMI OB Status Smoking Status 98% 32.09 kg/m2 Unknown Current Every Day Smoker BMI and BSA Data Body Mass Index Body Surface Area 32.09 kg/m 2 1.97 m 2 Preferred Pharmacy Pharmacy Name [facility] [phone] [facility] [place], [address] AT [address] [phone] Your Updated Medication List  
  
   
This list is accurate as of: 9/8/17  9:56 AM.  Always use your most recent med list.  
  
  
  
  
 divalproex  mg ER tablet Commonly known as:  DEPAKOTE ER Take 500 mg by mouth two (2) times a day. KlonoPIN 0.5 mg tablet Generic drug:  clonazePAM  
Take  by mouth.  
  
 lithium carbonate 300 mg tablet Take 2 Tabs by mouth two (2) times a day.  Indications: BIPOLAR DISORDER  
  
 OTHER  
  
  
  
  
 Follow-up Instructions Return in about 3 months (around 12/8/2017). Patient Instructions Well Visit, Ages 25 to 48: Care Instructions Your Care Instructions Physical exams can help you stay healthy. Your doctor has checked your overall health and may have suggested ways to take good care of yourself. He or she also may have recommended tests. At home, you can help prevent illness with healthy eating, regular exercise, and other steps. Follow-up care is a key part of your treatment and safety. Be sure to make and go to all appointments, and call your doctor if you are having problems. It's also a good idea to know your test results and keep a list of the medicines you take. How can you care for yourself at home? · Reach and stay at a healthy weight. This will lower your risk for many problems, such as obesity, diabetes, heart disease, and high blood pressure. · Get at least 30 minutes of physical activity on most days of the week. Walking is a good choice. You also may want to do other activities, such as running, swimming, cycling, or playing tennis or team sports. Discuss any changes in your exercise program with your doctor. · Do not smoke or allow others to smoke around you. If you need help quitting, talk to your doctor about stop-smoking programs and medicines. These can increase your chances of quitting for good. · Talk to your doctor about whether you have any risk factors for sexually transmitted infections (STIs). Having one sex partner (who does not have STIs and does not have sex with anyone else) is a good way to avoid these infections. · Use birth control if you do not want to have children at this time. Talk with your doctor about the choices available and what might be best for you. · Protect your skin from too much sun.  When you're outdoors from 10 a.m. to 4 p.m., stay in the shade or cover up with clothing and a hat with a wide brim. Wear sunglasses that block UV rays. Even when it's cloudy, put broad-spectrum sunscreen (SPF 30 or higher) on any exposed skin. · See a dentist one or two times a year for checkups and to have your teeth cleaned. · Wear a seat belt in the car. · Drink alcohol in moderation, if at all. That means no more than 2 drinks a day for men and 1 drink a day for women. Follow your doctor's advice about when to have certain tests. These tests can spot problems early. For everyone · Cholesterol. Have the fat (cholesterol) in your blood tested after age 21. Your doctor will tell you how often to have this done based on your age, family history, or other things that can increase your risk for heart disease. · Blood pressure. Have your blood pressure checked during a routine doctor visit. Your doctor will tell you how often to check your blood pressure based on your age, your blood pressure results, and other factors. · Vision. Talk with your doctor about how often to have a glaucoma test. 
· Diabetes. Ask your doctor whether you should have tests for diabetes. · Colon cancer. Have a test for colon cancer at age 48. You may have one of several tests. If you are younger than 48, you may need a test earlier if you have any risk factors. Risk factors include whether you already had a precancerous polyp removed from your colon or whether your parent, brother, sister, or child has had colon cancer. For women · Breast exam and mammogram. Talk to your doctor about when you should have a clinical breast exam and a mammogram. Medical experts differ on whether and how often women under 50 should have these tests. Your doctor can help you decide what is right for you. · Pap test and pelvic exam. Begin Pap tests at age 24. A Pap test is the best way to find cervical cancer.  The test often is part of a pelvic exam. Ask how often to have this test. 
 · Tests for sexually transmitted infections (STIs). Ask whether you should have tests for STIs. You may be at risk if you have sex with more than one person, especially if your partners do not wear condoms. For men · Tests for sexually transmitted infections (STIs). Ask whether you should have tests for STIs. You may be at risk if you have sex with more than one person, especially if you do not wear a condom. · Testicular cancer exam. Ask your doctor whether you should check your testicles regularly. · Prostate exam. Talk to your doctor about whether you should have a blood test (called a PSA test) for prostate cancer. Experts differ on whether and when men should have this test. Some experts suggest it if you are older than 39 and are -American or have a father or brother who got prostate cancer when he was younger than 72. When should you call for help? Watch closely for changes in your health, and be sure to contact your doctor if you have any problems or symptoms that concern you. Where can you learn more? Go to http://bj-liset.info/. Enter P072 in the search box to learn more about \"Well Visit, Ages 25 to 48: Care Instructions. \" Current as of: July 19, 2016 Content Version: 11.3 © 8908-1506 [employer]. Care instructions adapted under license by [employer] (which disclaims liability or warranty for this information). If you have questions about a medical condition or this instruction, always ask your healthcare professional. [place] [address] any warranty or liability for your use of this information. Stopping Smokeless Tobacco Use: Care Instructions Your Care Instructions Smokeless tobacco comes in many forms, such as snuff and chewing tobacco: · Snuff is finely ground tobacco sold in cans or pouches. Most of the time, snuff is used by putting a \"pinch\" or \"dip\" between the lower lip or cheek and the gum. · Chewing tobacco is sold as loose leaves, plugs, or twists. It is chewed or placed between the cheek and the gum or teeth. There are plenty of reasons to stop using smokeless tobacco. These products are harmful. They are not risk-free alternatives to smoking. Smokeless tobacco contains nicotine, which is addicting. Though using smokeless tobacco is less harmful than smoking cigarettes, it can cause serious health problems, such as: 
· White patches or red sores in your mouth that can turn into mouth cancer involving the lip, tongue, or cheek. · Tooth loss and other dental problems. · Gum disease. Your gums may pull away from your teeth and not grow back. People who use smokeless tobacco crave the nicotine in it. Giving up smokeless tobacco is much harder than simply changing a habit. Your body has to stop craving the nicotine. It is hard to quit, but you can do it. Many tools are available for people who want to quit using smokeless tobacco. You may find that combining tools works best for you. There are several steps to quitting. First you get ready to quit. Then you get support to help you. After that, you learn new skills and behaviors to quit. For many people, a necessary step is getting and using medicine. Your doctor will help you set up the plan that best meets your needs. You may want to attend a tobacco cessation program. When you choose a program, look for one that has proven success. Ask your doctor for ideas. You will greatly increase your chances of success if you take medicine as well as get counseling or join a cessation program. 
Some of the changes you feel when you first quit smokeless tobacco are uncomfortable. Your body will miss the nicotine at first, and you may feel short-tempered and grumpy. You may have trouble sleeping or concentrating. Medicine can help you deal with these symptoms. You may struggle with changing your habits and rituals.  The last step is the tricky one: Be prepared for the urge to use smokeless tobacco to continue for a time. This is a lot to deal with, but keep at it. You will feel better. Follow-up care is a key part of your treatment and safety. Be sure to make and go to all appointments, and call your doctor if you are having problems. It's also a good idea to know your test results and keep a list of the medicines you take. How can you care for yourself at home? · Ask your family, friends, and coworkers for support. You have a better chance of quitting if you have help and support. · Join a support group for people who are trying to quit using smokeless tobacco. 
· Set a quit date. Pick your date carefully so that it is not right in the middle of a big deadline or stressful time. After you quit, do not use smokeless tobacco even once. Get rid of all spit cups, cans, and pouches after your last use. Clean your house and your clothes so that they do not smell of tobacco. 
· Learn how to be a non-user. Think about ways you can avoid those things that make you reach for tobacco. 
¨ Learn some ways to deal with cravings, like calling a friend or going for a walk. Cravings often pass. ¨ Avoid situations that put you at greatest risk for using smokeless tobacco. For some people, it is hard to spend time with friends without dipping or chewing. For others, they might skip a coffee break with coworkers who smoke or use smokeless tobacco. 
¨ Change your daily routine. Take a different route to work, or eat a meal in a different place. · Cut down on stress. Calm yourself or release tension by doing an activity you enjoy, such as reading a book, taking a hot bath, or gardening. · Talk to your doctor or pharmacist about nicotine replacement therapy. You still get nicotine, but you do not use tobacco. Nicotine replacement products help you slowly reduce the amount of nicotine you need. Many of these products are available over the counter.  They include nicotine patches, gum, lozenges, and inhalers. · Ask your doctor about bupropion (Wellbutrin) or varenicline (Chantix), which are prescription medicines. They do not contain nicotine. They help you by reducing withdrawal symptoms, such as stress and anxiety. · Get regular exercise. Having healthy habits will help your body move past its craving for nicotine. · Be prepared to keep trying. Most people are not successful the first few times they try to quit. Do not get mad at yourself if you use tobacco again. Make a list of things you learned, and think about when you want to try again, such as next week, next month, or next year. Where can you learn more? Go to http://bj-liset.info/. Enter C609 in the search box to learn more about \"Stopping Smokeless Tobacco Use: Care Instructions. \" Current as of: March 20, 2017 Content Version: 11.3 © 9241-5283 [employer]. Care instructions adapted under license by [employer] (which disclaims liability or warranty for this information). If you have questions about a medical condition or this instruction, always ask your healthcare professional. [place] [address] any warranty or liability for your use of this information. Introducing [facility]! Dear Nini Gómez: 
Thank you for requesting a [employer] account. Our records indicate that you already have an active [employer] account. You can access your account anytime at [email or web address] Did you know that you can access your hospital and ER discharge instructions at any time in [employer]? You can also review all of your test results from your hospital stay or ER visit. Additional Information If you have questions, please visit the Frequently Asked Questions section of the [employer] website at [email or web address] Remember, [employer] is NOT to be used for urgent needs. For medical emergencies, dial 911. Now available from your iPhone and Android! Please provide this summary of care documentation to your next provider. Your primary care clinician is listed as Silvia Barreto. If you have any questions after today's visit, please call [phone].

## 2017-09-08 NOTE — PATIENT INSTRUCTIONS
Well Visit, Ages 25 to 48: Care Instructions  Your Care Instructions  Physical exams can help you stay healthy. Your doctor has checked your overall health and may have suggested ways to take good care of yourself. He or she also may have recommended tests. At home, you can help prevent illness with healthy eating, regular exercise, and other steps. Follow-up care is a key part of your treatment and safety. Be sure to make and go to all appointments, and call your doctor if you are having problems. It's also a good idea to know your test results and keep a list of the medicines you take. How can you care for yourself at home? · Reach and stay at a healthy weight. This will lower your risk for many problems, such as obesity, diabetes, heart disease, and high blood pressure. · Get at least 30 minutes of physical activity on most days of the week. Walking is a good choice. You also may want to do other activities, such as running, swimming, cycling, or playing tennis or team sports. Discuss any changes in your exercise program with your doctor. · Do not smoke or allow others to smoke around you. If you need help quitting, talk to your doctor about stop-smoking programs and medicines. These can increase your chances of quitting for good. · Talk to your doctor about whether you have any risk factors for sexually transmitted infections (STIs). Having one sex partner (who does not have STIs and does not have sex with anyone else) is a good way to avoid these infections. · Use birth control if you do not want to have children at this time. Talk with your doctor about the choices available and what might be best for you. · Protect your skin from too much sun. When you're outdoors from 10 a.m. to 4 p.m., stay in the shade or cover up with clothing and a hat with a wide brim. Wear sunglasses that block UV rays. Even when it's cloudy, put broad-spectrum sunscreen (SPF 30 or higher) on any exposed skin.   · See a dentist one or two times a year for checkups and to have your teeth cleaned. · Wear a seat belt in the car. · Drink alcohol in moderation, if at all. That means no more than 2 drinks a day for men and 1 drink a day for women. Follow your doctor's advice about when to have certain tests. These tests can spot problems early. For everyone  · Cholesterol. Have the fat (cholesterol) in your blood tested after age 21. Your doctor will tell you how often to have this done based on your age, family history, or other things that can increase your risk for heart disease. · Blood pressure. Have your blood pressure checked during a routine doctor visit. Your doctor will tell you how often to check your blood pressure based on your age, your blood pressure results, and other factors. · Vision. Talk with your doctor about how often to have a glaucoma test.  · Diabetes. Ask your doctor whether you should have tests for diabetes. · Colon cancer. Have a test for colon cancer at age 48. You may have one of several tests. If you are younger than 48, you may need a test earlier if you have any risk factors. Risk factors include whether you already had a precancerous polyp removed from your colon or whether your parent, brother, sister, or child has had colon cancer. For women  · Breast exam and mammogram. Talk to your doctor about when you should have a clinical breast exam and a mammogram. Medical experts differ on whether and how often women under 50 should have these tests. Your doctor can help you decide what is right for you. · Pap test and pelvic exam. Begin Pap tests at age 24. A Pap test is the best way to find cervical cancer. The test often is part of a pelvic exam. Ask how often to have this test.  · Tests for sexually transmitted infections (STIs). Ask whether you should have tests for STIs. You may be at risk if you have sex with more than one person, especially if your partners do not wear condoms.   For men  · Tests for sexually transmitted infections (STIs). Ask whether you should have tests for STIs. You may be at risk if you have sex with more than one person, especially if you do not wear a condom. · Testicular cancer exam. Ask your doctor whether you should check your testicles regularly. · Prostate exam. Talk to your doctor about whether you should have a blood test (called a PSA test) for prostate cancer. Experts differ on whether and when men should have this test. Some experts suggest it if you are older than 39 and are -American or have a father or brother who got prostate cancer when he was younger than 72. When should you call for help? Watch closely for changes in your health, and be sure to contact your doctor if you have any problems or symptoms that concern you. Where can you learn more? Go to http://bj-liset.info/. Enter P072 in the search box to learn more about \"Well Visit, Ages 25 to 48: Care Instructions. \"  Current as of: July 19, 2016  Content Version: 11.3  © 5981-8256 [employer]. Care instructions adapted under license by [email or web address] (which disclaims liability or warranty for this information). If you have questions about a medical condition or this instruction, always ask your healthcare professional. [place] [address] any warranty or liability for your use of this information. Stopping Smokeless Tobacco Use: Care Instructions  Your Care Instructions  Smokeless tobacco comes in many forms, such as snuff and chewing tobacco:  · Snuff is finely ground tobacco sold in cans or pouches. Most of the time, snuff is used by putting a \"pinch\" or \"dip\" between the lower lip or cheek and the gum. · Chewing tobacco is sold as loose leaves, plugs, or twists. It is chewed or placed between the cheek and the gum or teeth. There are plenty of reasons to stop using smokeless tobacco. These products are harmful.  They are not risk-free alternatives to smoking. Smokeless tobacco contains nicotine, which is addicting. Though using smokeless tobacco is less harmful than smoking cigarettes, it can cause serious health problems, such as:  · White patches or red sores in your mouth that can turn into mouth cancer involving the lip, tongue, or cheek. · Tooth loss and other dental problems. · Gum disease. Your gums may pull away from your teeth and not grow back. People who use smokeless tobacco crave the nicotine in it. Giving up smokeless tobacco is much harder than simply changing a habit. Your body has to stop craving the nicotine. It is hard to quit, but you can do it. Many tools are available for people who want to quit using smokeless tobacco. You may find that combining tools works best for you. There are several steps to quitting. First you get ready to quit. Then you get support to help you. After that, you learn new skills and behaviors to quit. For many people, a necessary step is getting and using medicine. Your doctor will help you set up the plan that best meets your needs. You may want to attend a tobacco cessation program. When you choose a program, look for one that has proven success. Ask your doctor for ideas. You will greatly increase your chances of success if you take medicine as well as get counseling or join a cessation program.  Some of the changes you feel when you first quit smokeless tobacco are uncomfortable. Your body will miss the nicotine at first, and you may feel short-tempered and grumpy. You may have trouble sleeping or concentrating. Medicine can help you deal with these symptoms. You may struggle with changing your habits and rituals. The last step is the tricky one: Be prepared for the urge to use smokeless tobacco to continue for a time. This is a lot to deal with, but keep at it. You will feel better. Follow-up care is a key part of your treatment and safety.  Be sure to make and go to all appointments, and call your doctor if you are having problems. It's also a good idea to know your test results and keep a list of the medicines you take. How can you care for yourself at home? · Ask your family, friends, and coworkers for support. You have a better chance of quitting if you have help and support. · Join a support group for people who are trying to quit using smokeless tobacco.  · Set a quit date. Pick your date carefully so that it is not right in the middle of a big deadline or stressful time. After you quit, do not use smokeless tobacco even once. Get rid of all spit cups, cans, and pouches after your last use. Clean your house and your clothes so that they do not smell of tobacco.  · Learn how to be a non-user. Think about ways you can avoid those things that make you reach for tobacco.  ¨ Learn some ways to deal with cravings, like calling a friend or going for a walk. Cravings often pass. ¨ Avoid situations that put you at greatest risk for using smokeless tobacco. For some people, it is hard to spend time with friends without dipping or chewing. For others, they might skip a coffee break with coworkers who smoke or use smokeless tobacco.  ¨ Change your daily routine. Take a different route to work, or eat a meal in a different place. · Cut down on stress. Calm yourself or release tension by doing an activity you enjoy, such as reading a book, taking a hot bath, or gardening. · Talk to your doctor or pharmacist about nicotine replacement therapy. You still get nicotine, but you do not use tobacco. Nicotine replacement products help you slowly reduce the amount of nicotine you need. Many of these products are available over the counter. They include nicotine patches, gum, lozenges, and inhalers. · Ask your doctor about bupropion (Wellbutrin) or varenicline (Chantix), which are prescription medicines. They do not contain nicotine.  They help you by reducing withdrawal symptoms, such as stress and anxiety. · Get regular exercise. Having healthy habits will help your body move past its craving for nicotine. · Be prepared to keep trying. Most people are not successful the first few times they try to quit. Do not get mad at yourself if you use tobacco again. Make a list of things you learned, and think about when you want to try again, such as next week, next month, or next year. Where can you learn more? Go to http://bj-liset.info/. Enter F472 in the search box to learn more about \"Stopping Smokeless Tobacco Use: Care Instructions. \"  Current as of: March 20, 2017  Content Version: 11.3  © 6781-0752 [employer]. Care instructions adapted under license by [employer] (which disclaims liability or warranty for this information). If you have questions about a medical condition or this instruction, always ask your healthcare professional. [address] any warranty or liability for your use of this information.

## 2017-10-12 ENCOUNTER — OFFICE VISIT (OUTPATIENT)
Dept: FAMILY MEDICINE CLINIC | Age: 27
End: 2017-10-12

## 2017-10-12 VITALS
TEMPERATURE: 99.9 F | WEIGHT: 182 LBS | OXYGEN SATURATION: 98 % | BODY MASS INDEX: 31.07 KG/M2 | HEIGHT: 64 IN | SYSTOLIC BLOOD PRESSURE: 116 MMHG | RESPIRATION RATE: 16 BRPM | DIASTOLIC BLOOD PRESSURE: 80 MMHG | HEART RATE: 128 BPM

## 2017-10-12 DIAGNOSIS — L05.91 INFECTED PILONIDAL CYST: Primary | ICD-10-CM

## 2017-10-12 DIAGNOSIS — E66.9 OBESITY, UNSPECIFIED CLASSIFICATION, UNSPECIFIED OBESITY TYPE, UNSPECIFIED WHETHER SERIOUS COMORBIDITY PRESENT: ICD-10-CM

## 2017-10-12 DIAGNOSIS — Z72.0 TOBACCO USE: ICD-10-CM

## 2017-10-12 RX ORDER — SULFAMETHOXAZOLE AND TRIMETHOPRIM 800; 160 MG/1; MG/1
1 TABLET ORAL 2 TIMES DAILY
Qty: 20 TAB | Refills: 0 | Status: SHIPPED | OUTPATIENT
Start: 2017-10-12 | End: 2017-10-22

## 2017-10-12 NOTE — PROGRESS NOTES
10- spoke with Rossy Nixon at Dr. Cate Yadav location who intern spoke with Malena Robb at the [facility] location. Per Serg Wilkes will call to work patient in on  Dr. Sherman Fails schedule for Thursday afternoon or Friday afternoon.

## 2017-10-12 NOTE — PROGRESS NOTES
SUBJECTIVE  Chief Complaint   Patient presents with    Wound Infection     located near tail bone/ buttocks area for 1 week (Hurts to sit and no back pain noted0     Patient presents complaining of a painful lower back lesion that has grown and become increasingly painful over the past few days. Started about 1 week ago. She has never had one that has looked or felt like this. Pain is tenderness. It does not radiate. No change in bowels or pain with BMs. She does not report any fevers. OBJECTIVE    Blood pressure 116/80, pulse (!) 128, temperature 99.9 °F (37.7 °C), temperature source Oral, resp. rate 16, height 5' 4.25\" (1.632 m), weight 182 lb (82.6 kg), SpO2 98 %. General:  alert, cooperative, well appearing, in no apparent distress. Skin:  There is a large area of erythema, induration ,and scab formation at the top of the buttocks midline over the cleft. There is tenderness. There is no active drainage but appears that it has been slowly draining. ASSESSMENT / PLAN    ICD-10-CM ICD-9-CM    1. Infected pilonidal cyst L05.91 685.1    2. Tobacco use Z72.0 305.1    3. Obesity, unspecified classification, unspecified obesity type, unspecified whether serious comorbidity present E66.9 278.00      Start Bactrim DS 1 tab po bid for 10 days. Warm Sitz baths. Patient ed handout on pilonidal cysts. Refer to general surgery for either wide excision or I&D. We have called her surgeon, Dr. Luisa Banerjee and have discussed getting her in to their office. Would benefit from smoking cessation and diet / exercise - healthy living handout. 15 minutes of face to face time spent with the patient with at least 50% on coordination of care. We had to called her surgeon and make arrangements for her to be seen. All chart history elements were reviewed by me at the time of the visit even though marked at time of note closure. Patient understands our medical plan.  Patient has provided input and agrees with goals. Alternatives have been explained and offered. All questions answered. The patient is to call if condition worsens or fails to improve. As needed follow-up.

## 2017-10-12 NOTE — MR AVS SNAPSHOT
Visit Information Date & Time Provider Department Dept. Phone Encounter #  
 10/12/2017  2:00 PM Miladys Galindo, [address] [id number] Your Appointments 12/11/2017  3:15 PM  
ROUTINE CARE with Yessica Chatman MD  
[facility] ([facility]) Appt Note: 3 month followup [address]  
[address] [facility] [place]note 2] [address] Upcoming Health Maintenance Date Due  
 PAP AKA CERVICAL CYTOLOGY 8/21/2020 DTaP/Tdap/Td series (3 - Td) 9/8/2027 Allergies as of 10/12/2017  Review Complete On: 10/12/2017 By: Miladys Galindo MD  
 No Known Allergies Current Immunizations  Reviewed on 9/1/2015 Name Date Influenza Vaccine Gustavo Lobstein) 9/1/2015 Tdap 1/1/2007 Not reviewed this visit You Were Diagnosed With   
  
 Codes Comments Infected pilonidal cyst    -  Primary ICD-10-CM: L05.91 
ICD-9-CM: 685.1 Tobacco use     ICD-10-CM: Z72.0 ICD-9-CM: 305.1 Obesity, unspecified classification, unspecified obesity type, unspecified whether serious comorbidity present     ICD-10-CM: E66.9 ICD-9-CM: 278.00 Vitals BP Pulse Temp Resp Height(growth percentile) Weight(growth percentile) 116/80 (BP 1 Location: Right arm, BP Patient Position: Sitting) (!) 128 99.9 °F (37.7 °C) (Oral) 16 5' 4.25\" (1.632 m) 182 lb (82.6 kg) SpO2 BMI OB Status Smoking Status 98% 31 kg/m2 Unknown Current Every Day Smoker Vitals History BMI and BSA Data Body Mass Index Body Surface Area  
 31 kg/m 2 1.94 m 2 Preferred Pharmacy Pharmacy Name [phone] - [place], [address] AT [address] [phone] Your Updated Medication List  
  
   
This list is accurate as of: 10/12/17  2:34 PM.  Always use your most recent med list.  
  
  
  
  
 divalproex  mg ER tablet Commonly known as:  DEPAKOTE ER Take 500 mg by mouth two (2) times a day. KlonoPIN 0.5 mg tablet Generic drug:  clonazePAM  
Take  by mouth.  
  
 lithium carbonate 300 mg tablet Take 2 Tabs by mouth two (2) times a day. Indications: BIPOLAR DISORDER  
  
 OTHER  
  
 trimethoprim-sulfamethoxazole 160-800 mg per tablet Commonly known as:  BACTRIM DS, SEPTRA DS Take 1 Tab by mouth two (2) times a day for 10 days. Prescriptions Sent to Pharmacy Refills  
 trimethoprim-sulfamethoxazole (BACTRIM DS, SEPTRA DS) 160-800 mg per tablet 0 Sig: Take 1 Tab by mouth two (2) times a day for 10 days. Class: Normal  
 Pharmacy: [facility] [address] [place] [address] AT [address] Ph #: [phone] Route: Oral  
  
Patient Instructions Pilonidal Abscess: Care Instructions Your Care Instructions A pilonidal abscess is an infection caused by an ingrown hair. The abscess occurs in the area of the tailbone and the top of the buttocks. The infection causes a pocket of pus to form. It can be quite painful. Your doctor may have opened and drained the abscess. You can take care of yourself at home to help the area heal. In some cases, the abscess returns. Your doctor may suggest surgery to remove the site of the infection if it comes back. You may have had a sedative to help you relax. You may be unsteady after having sedation. It can take a few hours for the medicine's effects to wear off. Common side effects of sedation include nausea, vomiting, and feeling sleepy or tired. The doctor has checked you carefully, but problems can develop later. If you notice any problems or new symptoms, get medical treatment right away. Follow-up care is a key part of your treatment and safety.  Be sure to make and go to all appointments, and call your doctor if you are having problems. It's also a good idea to know your test results and keep a list of the medicines you take. How can you care for yourself at home? · If the doctor gave you a sedative: ¨ For 24 hours, don't do anything that requires attention to detail. It takes time for the medicine's effects to completely wear off. ¨ For your safety, do not drive or operate any machinery that could be dangerous. Wait until the medicine wears off and you can think clearly and react easily. · If your doctor prescribed antibiotics, take them exactly as directed. Do not stop taking them just because you feel better. You need to take the full course of antibiotics. · Be safe with medicines. Take pain medicines exactly as directed. ¨ If the doctor gave you a prescription medicine for pain, take it as prescribed. ¨ If you are not taking a prescription pain medicine, ask your doctor if you can take an over-the-counter medicine. · If your doctor opened and drained your abscess, you may have gauze or other packing material inside your wound. Follow all instructions from your doctor on how to care for your wound. · Keep the area of your wound very clean. Use wet cotton balls, a warm washcloth, or baby wipes. Clean the area gently, especially after a bowel movement. When should you call for help? Call 911 anytime you think you may need emergency care. For example, call if: 
· You have trouble breathing. · You passed out (lost consciousness). Call your doctor now or seek immediate medical care if: 
· You have new or worse nausea or vomiting. · You have symptoms of infection, such as: 
¨ Increased pain, swelling, warmth, or redness. ¨ Red streaks leading from the area. ¨ Pus draining from the area. ¨ A fever. Watch closely for changes in your health, and be sure to contact your doctor if: 
· You do not get better as expected. Where can you learn more? Go to http://bj-liset.info/. Enter 22 [id number] in the search box to learn more about \"Pilonidal Abscess: Care Instructions. \" Current as of: October 13, 2016 Content Version: 11.3 © 7486-9581 Seer. Care instructions adapted under license by NewAer (which disclaims liability or warranty for this information). If you have questions about a medical condition or this instruction, always ask your healthcare professional. [place] [address] any warranty or liability for your use of this information. A Healthy Lifestyle: Care Instructions Your Care Instructions A healthy lifestyle can help you feel good, stay at a healthy weight, and have plenty of energy for both work and play. A healthy lifestyle is something you can share with your whole family. A healthy lifestyle also can lower your risk for serious health problems, such as high blood pressure, heart disease, and diabetes. You can follow a few steps listed below to improve your health and the health of your family. Follow-up care is a key part of your treatment and safety. Be sure to make and go to all appointments, and call your doctor if you are having problems. Its also a good idea to know your test results and keep a list of the medicines you take. How can you care for yourself at home? · Do not eat too much sugar, fat, or fast foods. You can still have dessert and treats now and then. The goal is moderation. · Start small to improve your eating habits. Pay attention to portion sizes, drink less juice and soda pop, and eat more fruits and vegetables. ¨ Eat a healthy amount of food. A 3-ounce serving of meat, for example, is about the size of a deck of cards. Fill the rest of your plate with vegetables and whole grains. ¨ Limit the amount of soda and sports drinks you have every day. Drink more water when you are thirsty. ¨ Eat at least 5 servings of fruits and vegetables every day.  It may seem like a lot, but it is not hard to reach this goal. A serving or helping is 1 piece of fruit, 1 cup of vegetables, or 2 cups of leafy, raw vegetables. Have an apple or some carrot sticks as an afternoon snack instead of a candy bar. Try to have fruits and/or vegetables at every meal. 
· Make exercise part of your daily routine. You may want to start with simple activities, such as walking, bicycling, or slow swimming. Try to be active 30 to 60 minutes every day. You do not need to do all 30 to 60 minutes all at once. For example, you can exercise 3 times a day for 10 or 20 minutes. Moderate exercise is safe for most people, but it is always a good idea to talk to your doctor before starting an exercise program. 
· Keep moving. [place] the lawn, work in the garden, or [employer]. Take the stairs instead of the elevator at work. · If you smoke, quit. People who smoke have an increased risk for heart attack, stroke, cancer, and other lung illnesses. Quitting is hard, but there are ways to boost your chance of quitting tobacco for good. ¨ Use nicotine gum, patches, or lozenges. ¨ Ask your doctor about stop-smoking programs and medicines. ¨ Keep trying. In addition to reducing your risk of diseases in the future, you will notice some benefits soon after you stop using tobacco. If you have shortness of breath or asthma symptoms, they will likely get better within a few weeks after you quit. · Limit how much alcohol you drink. Moderate amounts of alcohol (up to 2 drinks a day for men, 1 drink a day for women) are okay. But drinking too much can lead to liver problems, high blood pressure, and other health problems. Family health If you have a family, there are many things you can do together to improve your health. · Eat meals together as a family as often as possible. · Eat healthy foods. This includes fruits, vegetables, lean meats and dairy, and whole grains. · Include your family in your fitness plan. Most people think of activities such as jogging or tennis as the way to fitness, but there are many ways you and your family can be more active. Anything that makes you breathe hard and gets your heart pumping is exercise. Here are some tips: 
¨ Walk to do errands or to take your child to school or the bus. ¨ Go for a family bike ride after dinner instead of watching TV. Where can you learn more? Go to http://bj-liset.info/. Enter R495 in the search box to learn more about \"A Healthy Lifestyle: Care Instructions. \" Current as of: July 26, 2016 Content Version: 11.3 © 7177-0814 S4 Worldwide. Care instructions adapted under license by Zango (which disclaims liability or warranty for this information). If you have questions about a medical condition or this instruction, always ask your healthcare professional. [place] [address] any warranty or liability for your use of this information. Patient Instructions History Introducing \[facility]\"" & HEALTH SERVICES! Dear Sierra Lazaro: 
Thank you for requesting a Waste2T[employer] account. Our records indicate that you already have an active Waste[employer]Tric[employer] account. You can access your account anytime at [email or web address] Did you know that you can access your hospital and ER discharge instructions at any time in Waste[employer]Tricity? You can also review all of your test results from your hospital stay or ER visit. Additional Information If you have questions, please visit the Frequently Asked Questions section of the Waste[employer]Tric[employer] website at [email or web address] Remember, Waste2Tricity is NOT to be used for urgent needs. For medical emergencies, dial 911. Now available from your iPhone and Android! Please provide this summary of care documentation to your next provider.  
  
  
 Your primary care clinician is listed as Swathi Ryan. If you have any questions after today's visit, please call [phone].

## 2017-10-12 NOTE — PROGRESS NOTES
1. Have you been to the ER, urgent care clinic since your last visit? Hospitalized since your last visit? No    2. Have you seen or consulted any other health care providers outside of the [address] [place] [address] since your last visit? Include any pap smears or colon screening.  No

## 2017-10-12 NOTE — PATIENT INSTRUCTIONS
Pilonidal Abscess: Care Instructions  Your Care Instructions    A pilonidal abscess is an infection caused by an ingrown hair. The abscess occurs in the area of the tailbone and the top of the buttocks. The infection causes a pocket of pus to form. It can be quite painful. Your doctor may have opened and drained the abscess. You can take care of yourself at home to help the area heal. In some cases, the abscess returns. Your doctor may suggest surgery to remove the site of the infection if it comes back. You may have had a sedative to help you relax. You may be unsteady after having sedation. It can take a few hours for the medicine's effects to wear off. Common side effects of sedation include nausea, vomiting, and feeling sleepy or tired. The doctor has checked you carefully, but problems can develop later. If you notice any problems or new symptoms, get medical treatment right away. Follow-up care is a key part of your treatment and safety. Be sure to make and go to all appointments, and call your doctor if you are having problems. It's also a good idea to know your test results and keep a list of the medicines you take. How can you care for yourself at home? · If the doctor gave you a sedative:  ¨ For 24 hours, don't do anything that requires attention to detail. It takes time for the medicine's effects to completely wear off. ¨ For your safety, do not drive or operate any machinery that could be dangerous. Wait until the medicine wears off and you can think clearly and react easily. · If your doctor prescribed antibiotics, take them exactly as directed. Do not stop taking them just because you feel better. You need to take the full course of antibiotics. · Be safe with medicines. Take pain medicines exactly as directed. ¨ If the doctor gave you a prescription medicine for pain, take it as prescribed.   ¨ If you are not taking a prescription pain medicine, ask your doctor if you can take an over-the-counter medicine. · If your doctor opened and drained your abscess, you may have gauze or other packing material inside your wound. Follow all instructions from your doctor on how to care for your wound. · Keep the area of your wound very clean. Use wet cotton balls, a warm washcloth, or baby wipes. Clean the area gently, especially after a bowel movement. When should you call for help? Call 911 anytime you think you may need emergency care. For example, call if:  · You have trouble breathing. · You passed out (lost consciousness). Call your doctor now or seek immediate medical care if:  · You have new or worse nausea or vomiting. · You have symptoms of infection, such as:  ¨ Increased pain, swelling, warmth, or redness. ¨ Red streaks leading from the area. ¨ Pus draining from the area. ¨ A fever. Watch closely for changes in your health, and be sure to contact your doctor if:  · You do not get better as expected. Where can you learn more? Go to http://bj-liset.info/. Enter 22 [id number] in the search box to learn more about \"Pilonidal Abscess: Care Instructions. \"  Current as of: October 13, 2016  Content Version: 11.3  © 4809-0590 RegistryLove. Care instructions adapted under license by Box (which disclaims liability or warranty for this information). If you have questions about a medical condition or this instruction, always ask your healthcare professional. [place] [address] any warranty or liability for your use of this information. A Healthy Lifestyle: Care Instructions  Your Care Instructions  A healthy lifestyle can help you feel good, stay at a healthy weight, and have plenty of energy for both work and play. A healthy lifestyle is something you can share with your whole family. A healthy lifestyle also can lower your risk for serious health problems, such as high blood pressure, heart disease, and diabetes.   You can follow a few steps listed below to improve your health and the health of your family. Follow-up care is a key part of your treatment and safety. Be sure to make and go to all appointments, and call your doctor if you are having problems. Its also a good idea to know your test results and keep a list of the medicines you take. How can you care for yourself at home? · Do not eat too much sugar, fat, or fast foods. You can still have dessert and treats now and then. The goal is moderation. · Start small to improve your eating habits. Pay attention to portion sizes, drink less juice and soda pop, and eat more fruits and vegetables. ¨ Eat a healthy amount of food. A 3-ounce serving of meat, for example, is about the size of a deck of cards. Fill the rest of your plate with vegetables and whole grains. ¨ Limit the amount of soda and sports drinks you have every day. Drink more water when you are thirsty. ¨ Eat at least 5 servings of fruits and vegetables every day. It may seem like a lot, but it is not hard to reach this goal. A serving or helping is 1 piece of fruit, 1 cup of vegetables, or 2 cups of leafy, raw vegetables. Have an apple or some carrot sticks as an afternoon snack instead of a candy bar. Try to have fruits and/or vegetables at every meal.  · Make exercise part of your daily routine. You may want to start with simple activities, such as walking, bicycling, or slow swimming. Try to be active 30 to 60 minutes every day. You do not need to do all 30 to 60 minutes all at once. For example, you can exercise 3 times a day for 10 or 20 minutes. Moderate exercise is safe for most people, but it is always a good idea to talk to your doctor before starting an exercise program.  · Keep moving. Sravan Cluck the lawn, work in the garden, or [employer]. Take the stairs instead of the elevator at work. · If you smoke, quit.  People who smoke have an increased risk for heart attack, stroke, cancer, and other lung illnesses. Quitting is hard, but there are ways to boost your chance of quitting tobacco for good. ¨ Use nicotine gum, patches, or lozenges. ¨ Ask your doctor about stop-smoking programs and medicines. ¨ Keep trying. In addition to reducing your risk of diseases in the future, you will notice some benefits soon after you stop using tobacco. If you have shortness of breath or asthma symptoms, they will likely get better within a few weeks after you quit. · Limit how much alcohol you drink. Moderate amounts of alcohol (up to 2 drinks a day for men, 1 drink a day for women) are okay. But drinking too much can lead to liver problems, high blood pressure, and other health problems. Family health  If you have a family, there are many things you can do together to improve your health. · Eat meals together as a family as often as possible. · Eat healthy foods. This includes fruits, vegetables, lean meats and dairy, and whole grains. · Include your family in your fitness plan. Most people think of activities such as jogging or tennis as the way to fitness, but there are many ways you and your family can be more active. Anything that makes you breathe hard and gets your heart pumping is exercise. Here are some tips:  ¨ Walk to do errands or to take your child to school or the bus. ¨ Go for a family bike ride after dinner instead of watching TV. Where can you learn more? Go to http://bj-liset.info/. Enter B563 in the search box to learn more about \"A Healthy Lifestyle: Care Instructions. \"  Current as of: July 26, 2016  Content Version: 11.3  © 8148-0920 Open mHealth. Care instructions adapted under license by [employer] (which disclaims liability or warranty for this information).  If you have questions about a medical condition or this instruction, always ask your healthcare professional. [place] [address] any warranty or liability for your use of this information.

## 2018-11-30 ENCOUNTER — OFFICE VISIT (OUTPATIENT)
Dept: FAMILY MEDICINE CLINIC | Age: 28
End: 2018-11-30

## 2018-11-30 VITALS
RESPIRATION RATE: 16 BRPM | DIASTOLIC BLOOD PRESSURE: 80 MMHG | SYSTOLIC BLOOD PRESSURE: 118 MMHG | HEIGHT: 64 IN | BODY MASS INDEX: 34.49 KG/M2 | WEIGHT: 202 LBS | OXYGEN SATURATION: 98 % | HEART RATE: 83 BPM | TEMPERATURE: 97.8 F

## 2018-11-30 DIAGNOSIS — Z13.1 SCREENING FOR DIABETES MELLITUS (DM): ICD-10-CM

## 2018-11-30 DIAGNOSIS — Z13.220 SCREENING FOR LIPID DISORDERS: ICD-10-CM

## 2018-11-30 DIAGNOSIS — F17.200 SMOKER: ICD-10-CM

## 2018-11-30 DIAGNOSIS — Z00.00 WELL ADULT EXAM: Primary | ICD-10-CM

## 2018-11-30 NOTE — PATIENT INSTRUCTIONS

## 2018-11-30 NOTE — PROGRESS NOTES
Subjective:   25 y.o. female for Well Woman Check. Her gyne and breast care is done elsewhere by her Ob-Gyne physician. [facility]    Past Medical History:   Diagnosis Date    Bipolar depression ([address]     Desmoid tumor     for excision     Past Surgical History:   Procedure Laterality Date    HX  SECTION  2009     vaginal birth then c section     Family History   Problem Relation Age of Onset    Asthma Mother     Hypertension Father     Elevated Lipids Father     Diabetes Father     Heart Disease Father      Social History     Tobacco Use    Smoking status: Current Every Day Smoker     Packs/day: 1.00     Years: 9.00     Pack years: 9.00     Types: Cigarettes    Smokeless tobacco: Never Used   Substance Use Topics    Alcohol use: No     Alcohol/week: 0.5 oz     Types: 1 Standard drinks or equivalent per week     Comment: 1 x per week (socially)      ROS: Feeling generally well. No TIA's or unusual headaches, no dysphagia. No prolonged cough. No dyspnea or chest pain on exertion. No abdominal pain, change in bowel habits, black or bloody stools. No urinary tract symptoms. No new or unusual musculoskeletal symptoms. Specific concerns today: Bipolar d/o- reports to be doing well, continues to see psychiatrist  Smoking 1/2 ppd, not interested in quitting yet    Objective: The patient appears well, alert, oriented x 3, in no distress. Visit Vitals  /80 (BP 1 Location: Left arm, BP Patient Position: Sitting)   Pulse 83   Temp 97.8 °F (36.6 °C) (Oral)   Resp 16   Ht 5' 4.25\" (1.632 m)   Wt 202 lb (91.6 kg)   SpO2 98%   BMI 34.40 kg/m²     ENT normal.  Neck supple. No adenopathy or thyromegaly. KHADIJAH. Lungs are clear, good air entry, no wheezes, rhonchi or rales. S1 and S2 normal, no murmurs, regular rate and rhythm. Abdomen soft without tenderness, guarding, mass or organomegaly. Extremities show no edema, normal peripheral pulses.  Neurological is normal, no focal findings. Breast and Pelvic exams are deferred. Assessment/Plan:   Diagnoses and all orders for this visit:    1. Well adult exam  Well Woman  lose weight, increase physical activity, stop smoking (advice and handout given), routine labs ordered, call if any problems  reviewed diet, exercise and weight control  Declines Tdap/pcv/flu vaccines  Per pt utd with pap smear    2. Screening for diabetes mellitus (DM)  -     METABOLIC PANEL, COMPREHENSIVE; Future    3. Screening for lipid disorders  -     LIPID PANEL; Future    4. Smoker  Cessation discussed    5. BMI 34  Encourage wt loss    Ff-up in 1 year or sooner prn    Patient/guardian understands plan of care. Patient has provided input and agrees with goals. Future labs to be discussed on next visit.

## 2018-11-30 NOTE — PROGRESS NOTES
1. Have you been to the ER, urgent care clinic since your last visit? Hospitalized since your last visit? No    2. Have you seen or consulted any other health care providers outside of the [address] [place] [address] since your last visit? Include any pap smears or colon screening. Yes Pscyh    LMP;2017  Contraception type:no  Vaginal problems:no  Last mammogram:na  Last Pap:08/21/2017  Last Tdap:declined  Family hx of ovarian ca. no  Family hx of breast ca:no  Family hx of colon ca: no

## 2019-01-17 ENCOUNTER — HOSPITAL ENCOUNTER (OUTPATIENT)
Dept: LAB | Age: 29
Discharge: HOME | End: 2019-01-17

## 2019-01-17 LAB — SENTARA SPECIMEN COL,SENBCF: NORMAL

## 2019-01-17 PROCEDURE — 99001 SPECIMEN HANDLING PT-LAB: CPT

## 2019-01-18 LAB
A-G RATIO,AGRAT: 1.6 RATIO (ref 1.1–2.6)
ALBUMIN SERPL-MCNC: 4.6 G/DL (ref 3.5–5)
ALP SERPL-CCNC: 65 U/L (ref 25–115)
ALT SERPL-CCNC: 23 U/L (ref 5–40)
ANION GAP SERPL CALC-SCNC: 17 MMOL/L
AST SERPL W P-5'-P-CCNC: 14 U/L (ref 10–37)
BILIRUB SERPL-MCNC: 0.2 MG/DL (ref 0.2–1.2)
BUN SERPL-MCNC: 9 MG/DL (ref 6–22)
CALCIUM SERPL-MCNC: 10 MG/DL (ref 8.4–10.5)
CHLORIDE SERPL-SCNC: 100 MMOL/L (ref 98–110)
CHOLEST SERPL-MCNC: 220 MG/DL (ref 110–200)
CO2 SERPL-SCNC: 25 MMOL/L (ref 20–32)
CREAT SERPL-MCNC: 0.7 MG/DL (ref 0.5–1.2)
GFRAA, 66117: >60
GFRNA, 66118: >60
GLOBULIN,GLOB: 2.8 G/DL (ref 2–4)
GLUCOSE SERPL-MCNC: 89 MG/DL (ref 70–99)
HDLC SERPL-MCNC: 38 MG/DL (ref 40–59)
HDLC SERPL-MCNC: 5.8 MG/DL (ref 0–5)
LDLC SERPL CALC-MCNC: 141 MG/DL (ref 50–99)
POTASSIUM SERPL-SCNC: 4.4 MMOL/L (ref 3.5–5.5)
PROT SERPL-MCNC: 7.4 G/DL (ref 6.4–8.3)
SODIUM SERPL-SCNC: 142 MMOL/L (ref 133–145)
TRIGL SERPL-MCNC: 210 MG/DL (ref 40–149)
VLDLC SERPL CALC-MCNC: 42 MG/DL (ref 8–30)

## 2019-01-21 NOTE — PROGRESS NOTES
Borderline high cholesterol, advise low fat/low carb diet and wt loss  Dm screen normal  Will monitor, pls notify pt.

## 2019-08-15 ENCOUNTER — OFFICE VISIT (OUTPATIENT)
Dept: FAMILY MEDICINE CLINIC | Age: 29
End: 2019-08-15

## 2019-08-15 VITALS
BODY MASS INDEX: 33.15 KG/M2 | RESPIRATION RATE: 20 BRPM | TEMPERATURE: 97.5 F | HEART RATE: 72 BPM | HEIGHT: 65 IN | SYSTOLIC BLOOD PRESSURE: 114 MMHG | DIASTOLIC BLOOD PRESSURE: 76 MMHG | WEIGHT: 199 LBS | OXYGEN SATURATION: 92 %

## 2019-08-15 DIAGNOSIS — Z13.39 SCREENING FOR ALCOHOLISM: ICD-10-CM

## 2019-08-15 DIAGNOSIS — L73.9 FOLLICULITIS: Primary | ICD-10-CM

## 2019-08-15 DIAGNOSIS — D36.9 DERMOID CYST: ICD-10-CM

## 2019-08-15 DIAGNOSIS — F31.81 BIPOLAR 2 DISORDER (HCC): ICD-10-CM

## 2019-08-15 DIAGNOSIS — E78.2 MIXED HYPERLIPIDEMIA: ICD-10-CM

## 2019-08-15 NOTE — PATIENT INSTRUCTIONS
Folliculitis: Care Instructions  Your Care Instructions    Folliculitis (say \"yik-TYG-laj-LY-tus\") is an infection of the pouches (follicles) in the skin where hair grows. It can occur on any part of the body, but it is most common on the scalp, face, armpits, and groin. Bacteria, such as those found in a hot tub, can cause folliculitis. Folliculitis begins as a red, tender area near a strand of hair. The skin can itch or burn and may drain pus or blood. Sometimes folliculitis can lead to more serious skin infections. Your doctor usually can treat mild folliculitis with an antibiotic cream or ointment. If you have folliculitis on your scalp, you may use a shampoo that kills bacteria. Antibiotics you take as pills can treat infections deeper in the skin. For stubborn cases of folliculitis, laser treatment may be an option. Laser treatment uses strong beams of light to destroy the hair follicle. But hair will no longer grow in the treated area. Follow-up care is a key part of your treatment and safety. Be sure to make and go to all appointments, and call your doctor if you are having problems. It's also a good idea to know your test results and keep a list of the medicines you take. How can you care for yourself at home? · Take your medicine exactly as prescribed. If your doctor prescribed antibiotics, take them as directed. Do not stop taking them just because you feel better. You need to take the full course of antibiotics. · Use a soap that kills bacteria to wash the infected area. If your scalp or beard is infected, use a shampoo with selenium or propylene glycol. Be careful. Do not scrub too long or too hard. · Mix 1 1/3 cup warm water and 1 tablespoon vinegar. Soak a cloth in the mixture, and place it over the infected skin until it cools off (usually 5 to 10 minutes). You can do this 3 to 6 times a day. · Do not share your razor, towel, or washcloth. That can spread folliculitis.   · Use a new blade in your razor each time you shave to keep from re-infecting your skin. · If you tend to get folliculitis, avoid using hot tubs. They can contain bacteria that cause folliculitis. When should you call for help? Call your doctor now or seek immediate medical care if:    · You have symptoms of infection, such as:  ? Increased pain, swelling, warmth, or redness. ? Red streaks leading from the area. ? Pus draining from the area. ? A fever.    Watch closely for changes in your health, and be sure to contact your doctor if:    · You do not get better as expected. Where can you learn more? Go to http://bj-liset.info/. Enter M257 in the search box to learn more about \"Folliculitis: Care Instructions. \"  Current as of: April 1, 2019  Content Version: 12.1  © 0419-2695 [employer]. Care instructions adapted under license by [employer] (which disclaims liability or warranty for this information). If you have questions about a medical condition or this instruction, always ask your healthcare professional. [address] any warranty or liability for your use of this information.
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
## 2019-08-18 NOTE — PROGRESS NOTES
Nadir Craig is a 25 y.o.  female and presents with    Chief Complaint   Patient presents with    New Patient    Mass     patient c/o lump PKL       patient stated she noticed two in a half weeks ago             pain scale 0/10         Subjective:  Patient presents to establish care. Patient with concerns of a small lump over waistline on lower right side. Patient states area has been there for approximately 3 months. Patient has history of dermoid cyst in the past which was removed surgically from the abdomen. Patient was concerned that this could be similar presentation. No pain, and it does not interfere with activities of daily living. Current Outpatient Medications   Medication Sig Dispense Refill    divalproex ER (DEPAKOTE ER) 500 mg ER tablet Take 500 mg by mouth two (2) times a day. 2    OTHER       lithium carbonate 300 mg tablet Take 2 Tabs by mouth two (2) times a day. Indications: BIPOLAR DISORDER (Patient taking differently: Take 900 mg by mouth nightly. Indications: BIPOLAR DISORDER) 28 Tab 0    clonazePAM (KLONOPIN) 0.5 mg tablet Take  by mouth.        No Known Allergies     Social History     Socioeconomic History    Marital status:      Spouse name: Not on file    Number of children: Not on file    Years of education: Not on file    Highest education level: Not on file   Occupational History    Occupation: n/a   Social Needs    Financial resource strain: Not on file    Food insecurity:     Worry: Not on file     Inability: Not on file   [employer] [place] needs:     Medical: Not on file     Non-medical: Not on file   Tobacco Use    Smoking status: Current Every Day Smoker     Packs/day: 1.00     Years: 9.00     Pack years: 9.00     Types: Cigarettes    Smokeless tobacco: Never Used   Substance and Sexual Activity    Alcohol use: No     Alcohol/week: 0.8 standard drinks     Types: 1 Standard drinks or equivalent per week     Comment: 1 x per week (socially)    Drug use: No    Sexual activity: Yes     Partners: Male                       Family History   Problem Relation Age of Onset    Asthma Mother     Hypertension Father     Elevated Lipids Father     Diabetes Father     Heart Disease Father      Past Surgical History:   Procedure Laterality Date    HX  SECTION  2009     vaginal birth then c section     Past Medical History:   Diagnosis Date    B[facility] depression ([facility] [address]     Desmoid tumor 2014    for excision         Review of Systems   Constitutional: Negative for chills and fever. HENT: Negative. Eyes: Negative for blurred vision. Respiratory: Negative for shortness of breath. Cardiovascular: Negative for chest pain and leg swelling. Gastrointestinal: Negative for abdominal pain, constipation, diarrhea, nausea and vomiting. Genitourinary: Negative. Musculoskeletal: Negative. Skin: Negative.         + skin mass   Neurological: Negative for headaches. Psychiatric/Behavioral: Positive for depression. Objective:  Vitals:    08/15/19 1104   BP: 114/76   Pulse: 72   Resp: 20   Temp: 97.5 °F (36.4 °C)   TempSrc: Oral   SpO2: 92%   Weight: 199 lb (90.3 kg)   Height: 5' 4.96\" (1.65 m)   PainSc:   0 - No pain     General:   Well-groomed, well-nourished, in no distress, pleasant, alert, appropriate    Cardiovasc:   RRR,  no murmur, rubs or gallops. Pulmonary:    Lungs clear bilaterally, no wheezing, rales or rhonchi. Abdomen:   Abdomen soft, normal bowel sounds. No masses, tenderness,    rebound/rigidity or CVA tenderness. No hepatosplenomegaly. Skin:    + Hair follicle abscess lower right pubic region. Appears to be healing no erythema, swelling, or drainage. Assessment/Plan:      1. Mixed hyperlipidemia  Not currently on medication but with history of high lipid repeat labs due now  - CBC W/O DIFF; Future  - METABOLIC PANEL, BASIC; Future  - LIPID PANEL; Future    2.  Screening for alcoholism  - HEPATIC FUNCTION PANEL; Future    3. Bipolar 2 disorder ([facility]  Due for labs patient sees psychiatrist however requesting labs be done here and faxed to psych.  - LITHIUM; Future  - VALPROIC ACID; Future    4. Folliculitis  Although patient has history of a dermoid cyst in the past this appears to be folliculitis from shaving pubic area. Advised patient on shaving hazards, and presentation of abscesses associated with folliculitis. Meenakshi Guajardo Use warm compress to the area, consider antibiotics as needed if worsens. 5. Dermoid cyst  Reviewed medical records with patient from previous surgery. I have discussed the diagnosis with the patient and the intended plan as seen in the above orders. The patient has received an after-visit summary and questions were answered concerning future plans. I have discussed medication side effects and warnings with the patient as well. I have reviewed the plan of care with the patient, accepted their input and they are in agreement with the treatment goals. More than 1/2 of this 30 minute visit was spent face to face in counselling and coordination of care, as described above. This document may have been created with the aid of dictation software. Text may contain errors, particularly phonetic errors.      Aury GRIMES-BC

## 2019-08-22 ENCOUNTER — HOSPITAL ENCOUNTER (OUTPATIENT)
Dept: LAB | Age: 29
Discharge: HOME | End: 2019-08-22
Payer: MEDICAID

## 2019-08-22 LAB
ALBUMIN SERPL-MCNC: 3.7 G/DL (ref 3.4–5)
ALBUMIN/GLOB SERPL: 1.1 {RATIO} (ref 0.8–1.7)
ALP SERPL-CCNC: 62 U/L (ref 45–117)
ALT SERPL-CCNC: 31 U/L (ref 13–56)
ANION GAP SERPL CALC-SCNC: 4 MMOL/L (ref 3–18)
AST SERPL-CCNC: 16 U/L (ref 10–38)
BILIRUB DIRECT SERPL-MCNC: 0.1 MG/DL (ref 0–0.2)
BILIRUB SERPL-MCNC: 0.3 MG/DL (ref 0.2–1)
BUN SERPL-MCNC: 7 MG/DL (ref 7–18)
BUN/CREAT SERPL: 8 (ref 12–20)
CALCIUM SERPL-MCNC: 9.1 MG/DL (ref 8.5–10.1)
CHLORIDE SERPL-SCNC: 107 MMOL/L (ref 100–111)
CHOLEST SERPL-MCNC: 184 MG/DL
CO2 SERPL-SCNC: 27 MMOL/L (ref 21–32)
CREAT SERPL-MCNC: 0.87 MG/DL (ref 0.6–1.3)
ERYTHROCYTE [DISTWIDTH] IN BLOOD BY AUTOMATED COUNT: 13.7 % (ref 11.6–14.5)
GLOBULIN SER CALC-MCNC: 3.3 G/DL (ref 2–4)
GLUCOSE SERPL-MCNC: 93 MG/DL (ref 74–99)
HCT VFR BLD AUTO: 40.8 % (ref 35–45)
HDLC SERPL-MCNC: 37 MG/DL (ref 40–60)
HDLC SERPL: 5 {RATIO} (ref 0–5)
HGB BLD-MCNC: 13.1 G/DL (ref 12–16)
LDLC SERPL CALC-MCNC: 104 MG/DL (ref 0–100)
LIPID PROFILE,FLP: ABNORMAL
LITHIUM SERPL-SCNC: 0.8 MMOL/L (ref 0.6–1.2)
MCH RBC QN AUTO: 32.3 PG (ref 24–34)
MCHC RBC AUTO-ENTMCNC: 32.1 G/DL (ref 31–37)
MCV RBC AUTO: 100.5 FL (ref 74–97)
PLATELET # BLD AUTO: 195 K/UL (ref 135–420)
PMV BLD AUTO: 11.2 FL (ref 9.2–11.8)
POTASSIUM SERPL-SCNC: 4.2 MMOL/L (ref 3.5–5.5)
PROT SERPL-MCNC: 7 G/DL (ref 6.4–8.2)
RBC # BLD AUTO: 4.06 M/UL (ref 4.2–5.3)
SODIUM SERPL-SCNC: 138 MMOL/L (ref 136–145)
TRIGL SERPL-MCNC: 215 MG/DL (ref ?–150)
VALPROATE SERPL-MCNC: 78 UG/ML (ref 50–100)
VLDLC SERPL CALC-MCNC: 43 MG/DL
WBC # BLD AUTO: 8.5 K/UL (ref 4.6–13.2)

## 2019-08-22 PROCEDURE — 80164 ASSAY DIPROPYLACETIC ACD TOT: CPT

## 2019-08-22 PROCEDURE — 80178 ASSAY OF LITHIUM: CPT

## 2019-08-22 PROCEDURE — 80076 HEPATIC FUNCTION PANEL: CPT

## 2019-08-22 PROCEDURE — 85027 COMPLETE CBC AUTOMATED: CPT

## 2019-08-22 PROCEDURE — 80061 LIPID PANEL: CPT

## 2019-08-22 PROCEDURE — 80048 BASIC METABOLIC PNL TOTAL CA: CPT

## 2021-06-22 ENCOUNTER — OFFICE VISIT (OUTPATIENT)
Dept: FAMILY MEDICINE CLINIC | Age: 31
End: 2021-06-22
Payer: MEDICAID

## 2021-06-22 ENCOUNTER — TELEPHONE (OUTPATIENT)
Dept: FAMILY MEDICINE CLINIC | Age: 31
End: 2021-06-22

## 2021-06-22 ENCOUNTER — APPOINTMENT (OUTPATIENT)
Dept: FAMILY MEDICINE CLINIC | Age: 31
End: 2021-06-22

## 2021-06-22 VITALS
BODY MASS INDEX: 34.66 KG/M2 | WEIGHT: 208 LBS | RESPIRATION RATE: 16 BRPM | SYSTOLIC BLOOD PRESSURE: 107 MMHG | HEART RATE: 99 BPM | HEIGHT: 65 IN | TEMPERATURE: 97.9 F | OXYGEN SATURATION: 97 % | DIASTOLIC BLOOD PRESSURE: 75 MMHG

## 2021-06-22 DIAGNOSIS — Z00.00 ANNUAL PHYSICAL EXAM: ICD-10-CM

## 2021-06-22 DIAGNOSIS — E66.9 OBESITY (BMI 30.0-34.9): ICD-10-CM

## 2021-06-22 DIAGNOSIS — Z11.59 NEED FOR HEPATITIS C SCREENING TEST: ICD-10-CM

## 2021-06-22 DIAGNOSIS — Z00.00 ANNUAL PHYSICAL EXAM: Primary | ICD-10-CM

## 2021-06-22 DIAGNOSIS — Z72.0 TOBACCO ABUSE: ICD-10-CM

## 2021-06-22 DIAGNOSIS — F31.81 BIPOLAR 2 DISORDER (HCC): ICD-10-CM

## 2021-06-22 PROCEDURE — 99395 PREV VISIT EST AGE 18-39: CPT | Performed by: LEGAL MEDICINE

## 2021-06-22 RX ORDER — PALIPERIDONE PALMITATE 546 MG/1.75ML
INJECTION, SUSPENSION, EXTENDED RELEASE INTRAMUSCULAR
COMMUNITY
Start: 2021-06-21

## 2021-06-22 NOTE — PROGRESS NOTES
Dougie Folds     Chief Complaint   Patient presents with    Complete Physical     Vitals:    21 1051   BP: 107/75   Pulse: 99   Resp: 16   Temp: 97.9 °F (36.6 °C)   TempSrc: Temporal   SpO2: 97%   Weight: 208 lb (94.3 kg)   Height: 5' 4.96\" (1.65 m)   PainSc:   0 - No pain         HPI: Patient is here with her   Sam Godoy, and establish care and to get annual physical examination, she has no acute complaints  She is actively smoking 1 pack a day since the age of 11 she drinks,alcohol occasionally      Pap smear  Was done and office note need to be obtained , She is [facility] for [facility] and therapy Behavior health at [address]    Past Medical History:   Diagnosis Date    Bipolar depression ([facility] [address]     Desmoid tumor 2014    for excision     Past Surgical History:   Procedure Laterality Date    HX  SECTION  2009     vaginal birth then c section     Social History     Tobacco Use    Smoking status: Current Every Day Smoker     Packs/day: 1.00     Years: 9.00     Pack years: 9.00     Types: Cigarettes    Smokeless tobacco: Never Used   Substance Use Topics    Alcohol use: No     Alcohol/week: 0.8 standard drinks     Types: 1 Standard drinks or equivalent per week     Comment: 1 x per week (socially)       Family History   Problem Relation Age of Onset    Asthma Mother     Hypertension Father     Elevated Lipids Father     Diabetes Father     Heart Disease Father        Review of Systems   Constitutional: Negative for chills, fever, malaise/fatigue and weight loss. HENT: Negative for congestion, ear discharge, ear pain, hearing loss and nosebleeds. Eyes: Negative for blurred vision, double vision and discharge. Respiratory: Negative for cough, hemoptysis, sputum production, shortness of breath and wheezing. Cardiovascular: Negative for chest pain, palpitations, claudication, leg swelling and PND.    Gastrointestinal: Negative for abdominal pain, blood in stool, constipation, diarrhea, melena, nausea and vomiting. Genitourinary: Negative for dysuria, flank pain, frequency, hematuria and urgency. Musculoskeletal: Negative for back pain, falls, joint pain, myalgias and neck pain. Skin: Negative for itching and rash. Neurological: Negative for dizziness, tingling, sensory change, speech change, focal weakness, seizures, loss of consciousness, weakness and headaches. Psychiatric/Behavioral: Negative for depression, hallucinations, substance abuse and suicidal ideas. The patient is not nervous/anxious and does not have insomnia. Physical Exam  Constitutional:       General: She is not in acute distress. Appearance: She is well-developed. She is not diaphoretic. HENT:      Head: Normocephalic and atraumatic. Eyes:      General: No scleral icterus. Right eye: No discharge. Left eye: No discharge. Conjunctiva/sclera: Conjunctivae normal.      Pupils: Pupils are equal, round, and reactive to light. Neck:      Thyroid: No thyromegaly. Cardiovascular:      Rate and Rhythm: Normal rate and regular rhythm. Heart sounds: No murmur heard. Pulmonary:      Effort: Pulmonary effort is normal. No respiratory distress. Breath sounds: Normal breath sounds. No wheezing or rales. Chest:      Chest wall: No tenderness. Abdominal:      General: There is no distension. Palpations: Abdomen is soft. Tenderness: There is no abdominal tenderness. There is no rebound. Musculoskeletal:         General: No tenderness or deformity. Normal range of motion. Lymphadenopathy:      Cervical: No cervical adenopathy. Skin:     General: Skin is warm and dry. Coloration: Skin is not pale. Findings: No erythema or rash. Neurological:      Mental Status: She is alert and oriented to person, place, and time. Cranial Nerves: No cranial nerve deficit.       Coordination: Coordination normal. Psychiatric:         Behavior: Behavior normal.         Thought Content: Thought content normal.         Judgment: Judgment normal.          Assessment and plan     Plan of care has been discussed with the patient, he agrees to the plan and verbalized understanding. All his questions were answered  More than 50% of the time spent in this visit was counseling the patient about  illness and treatment options         1. Bipolar 2 disorder (HCC)  Stable on current medications    2. Annual physical exam  \  - METABOLIC PANEL, COMPREHENSIVE; Future  - LIPID PANEL; Future  - CBC WITH AUTOMATED DIFF; Future  - TSH 3RD GENERATION; Future    3. Need for hepatitis C screening test    - HEPATITIS C AB; Future    4. Tobacco abuse    I have discussed with patient to consider smoking cessation she said does help plan for pneumonia resolution, I offered patient help with medication or nicotine she will get that through her psychiatrist and also I advised her to reduce the number of cigarettes smoked per  day and to be a gradually     5. Obesity (BMI 30.0-34. 9)  Patient is working on lifestyle modification decrease carbohydrate and sugar and she is trying to increase physical activity by exercising daily    Current Outpatient Medications   Medication Sig Dispense Refill    norethindrone-ethinyl estradiol (ORTHO-NOVUM 1-35 TAB, NORTREL 1-35 TAB) 1-35 mg-mcg tab Take  by mouth.  Invega Trinza 546 mg/1.75 mL injection       divalproex ER (DEPAKOTE ER) 500 mg ER tablet Take 500 mg by mouth two (2) times a day. 2    lithium carbonate 300 mg tablet Take 2 Tabs by mouth two (2) times a day. Indications: BIPOLAR DISORDER (Patient taking differently: Take 900 mg by mouth nightly.  Indications: BIPOLAR DISORDER) 28 Tab 0    OTHER  (Patient not taking: Reported on 6/22/2021)         Patient Active Problem List    Diagnosis Date Noted    Bipolar disorder, curr episode mixed, severe, with psychotic features ([facility] [address] 07/07/2017    Poor dentition 03/19/2015    Dermoid cyst 11/21/2014    Bipolar 2 disorder ([facility] [address] 09/16/2013    Tobacco use 09/16/2013     Results for orders placed or performed during the hospital encounter of 08/22/19   CBC W/O DIFF   Result Value Ref Range    WBC 8.5 4.6 - 13.2 K/uL    RBC 4.06 (L) 4.20 - 5.30 M/uL    HGB 13.1 12.0 - 16.0 g/dL    HCT 40.8 35.0 - 45.0 %    .5 (H) 74.0 - 97.0 FL    MCH 32.3 24.0 - 34.0 PG    MCHC 32.1 31.0 - 37.0 g/dL    RDW 13.7 11.6 - 14.5 %    PLATELET 410 220 - 521 K/uL    MPV 11.2 9.2 - 98.5 FL   METABOLIC PANEL, BASIC   Result Value Ref Range    Sodium 138 136 - 145 mmol/L    Potassium 4.2 3.5 - 5.5 mmol/L    Chloride 107 100 - 111 mmol/L    CO2 27 21 - 32 mmol/L    Anion gap 4 3.0 - 18 mmol/L    Glucose 93 74 - 99 mg/dL    BUN 7 7.0 - 18 MG/DL    Creatinine 0.87 0.6 - 1.3 MG/DL    BUN/Creatinine ratio 8 (L) 12 - 20      GFR est AA >60 >60 ml/min/1.73m2    GFR est non-AA >60 >60 ml/min/1.73m2    Calcium 9.1 8.5 - 10.1 MG/DL   LITHIUM   Result Value Ref Range    Lithium level 0.80 0.6 - 1.2 MMOL/L   HEPATIC FUNCTION PANEL   Result Value Ref Range    Protein, total 7.0 6.4 - 8.2 g/dL    Albumin 3.7 3.4 - 5.0 g/dL    Globulin 3.3 2.0 - 4.0 g/dL    A-G Ratio 1.1 0.8 - 1.7      Bilirubin, total 0.3 0.2 - 1.0 MG/DL    Bilirubin, direct 0.1 0.0 - 0.2 MG/DL    Alk. phosphatase 62 45 - 117 U/L    AST (SGOT) 16 10 - 38 U/L    ALT (SGPT) 31 13 - 56 U/L   VALPROIC ACID   Result Value Ref Range    Valproic acid 78 50 - 100 ug/ml   LIPID PANEL   Result Value Ref Range    LIPID PROFILE          Cholesterol, total 184 <200 MG/DL    Triglyceride 215 (H) <150 MG/DL    HDL Cholesterol 37 (L) 40 - 60 MG/DL    LDL, calculated 104 (H) 0 - 100 MG/DL    VLDL, calculated 43 MG/DL    CHOL/HDL Ratio 5.0 0 - 5.0       No visits with results within 3 Month(s) from this visit.    Latest known visit with results is:   Hospital Outpatient Visit on 08/22/2019   Component Date Value Ref Range Status    WBC 08/22/2019 8.5 4.6 - 13.2 K/uL Final    RBC 08/22/2019 4.06* 4.20 - 5.30 M/uL Final    HGB 08/22/2019 13.1  12.0 - 16.0 g/dL Final    HCT 08/22/2019 40.8  35.0 - 45.0 % Final    MCV 08/22/2019 100.5* 74.0 - 97.0 FL Final    MCH 08/22/2019 32.3  24.0 - 34.0 PG Final    MCHC 08/22/2019 32.1  31.0 - 37.0 g/dL Final    RDW 08/22/2019 13.7  11.6 - 14.5 % Final    PLATELET 99/31/0242 045  135 - 420 K/uL Final    MPV 08/22/2019 11.2  9.2 - 11.8 FL Final    Sodium 08/22/2019 138  136 - 145 mmol/L Final    Potassium 08/22/2019 4.2  3.5 - 5.5 mmol/L Final    Chloride 08/22/2019 107  100 - 111 mmol/L Final    CO2 08/22/2019 27  21 - 32 mmol/L Final    Anion gap 08/22/2019 4  3.0 - 18 mmol/L Final    Glucose 08/22/2019 93  74 - 99 mg/dL Final    BUN 08/22/2019 7  7.0 - 18 MG/DL Final    Creatinine 08/22/2019 0.87  0.6 - 1.3 MG/DL Final    BUN/Creatinine ratio 08/22/2019 8* 12 - 20   Final    GFR est AA 08/22/2019 >60  >60 ml/min/1.73m2 Final    GFR est non-AA 08/22/2019 >60  >60 ml/min/1.73m2 Final    Comment: (NOTE)  Estimated GFR is calculated using the Modification of Diet in Renal   Disease (MDRD) Study equation, reported for both  Americans   (GFRAA) and non- Americans (GFRNA), and normalized to 1.73m2   body surface area. The physician must decide which value applies to   the patient. The MDRD study equation should only be used in   individuals age 25 or older. It has not been validated for the   following: pregnant women, patients with serious comorbid conditions,   or on certain medications, or persons with extremes of body size,   muscle mass, or nutritional status.       Calcium 08/22/2019 9.1  8.5 - 10.1 MG/DL Final    Lithium level 08/22/2019 0.80  0.6 - 1.2 MMOL/L Final    Protein, total 08/22/2019 7.0  6.4 - 8.2 g/dL Final    Albumin 08/22/2019 3.7  3.4 - 5.0 g/dL Final    Globulin 08/22/2019 3.3  2.0 - 4.0 g/dL Final    A-G Ratio 08/22/2019 1.1  0.8 - 1.7   Final    Bilirubin, total 08/22/2019 0.3  0.2 - 1.0 MG/DL Final    Bilirubin, direct 08/22/2019 0.1  0.0 - 0.2 MG/DL Final    Alk. phosphatase 08/22/2019 62  45 - 117 U/L Final    AST (SGOT) 08/22/2019 16  10 - 38 U/L Final    ALT (SGPT) 08/22/2019 31  13 - 56 U/L Final    Valproic acid 08/22/2019 78  50 - 100 ug/ml Final    LIPID PROFILE 08/22/2019        Final    Cholesterol, total 08/22/2019 184  <200 MG/DL Final    Triglyceride 08/22/2019 215* <150 MG/DL Final    Comment: The drugs N-acetylcysteine (NAC) and  Metamiszole have been found to cause falsely  low results in this chemical assay. Please  be sure to submit blood samples obtained  BEFORE administration of either of these  drugs to assure correct results.       HDL Cholesterol 08/22/2019 37* 40 - 60 MG/DL Final    LDL, calculated 08/22/2019 104* 0 - 100 MG/DL Final    VLDL, calculated 08/22/2019 43  MG/DL Final    CHOL/HDL Ratio 08/22/2019 5.0  0 - 5.0   Final          Follow-up and Dispositions    · Return in about 1 year (around 6/22/2022) for for annual physical.

## 2021-06-22 NOTE — PROGRESS NOTES
Jasbir Begum is a 26 y.o. female (: 1990) presenting to address:    Chief Complaint   Patient presents with    Complete Physical       Vitals:    21 1051   BP: 107/75   Pulse: 99   Resp: 16   Temp: 97.9 °F (36.6 °C)   TempSrc: Temporal   SpO2: 97%   Weight: 208 lb (94.3 kg)   Height: 5' 4.96\" (1.65 m)   PainSc:   0 - No pain       Is someone accompanying this pt? YES farshad snow / nurse     Is the patient using any DME equipment during OV? NO    Hearing/Vision:   No exam data present    Learning Assessment:     Learning Assessment 8/15/2019   PRIMARY LEARNER Patient   HIGHEST LEVEL OF EDUCATION - PRIMARY LEARNER  GRADUATED HIGH SCHOOL OR GED   BARRIERS PRIMARY LEARNER NONE   CO-LEARNER CAREGIVER No   PRIMARY LANGUAGE ENGLISH   LEARNER PREFERENCE PRIMARY DEMONSTRATION     -     -     -     -   ANSWERED BY patient   RELATIONSHIP SELF     Depression Screening:     3 most recent PHQ Screens 10/12/2017   PHQ Not Done Active Diagnosis of Depression or Bipolar Disorder   Little interest or pleasure in doing things Several days   Feeling down, depressed, irritable, or hopeless Several days   Total Score PHQ 2 2     Fall Risk Assessment:   No flowsheet data found. Coordination of Care Questionaire:   1. Have you been to the ER, urgent care clinic since your last visit? Hospitalized since your last visit? NO    2. Have you seen or consulted any other health care providers outside of the [address] [place] [address] since your last visit? Include any pap smears or colon screening. YES [facility]yn [facility], psychiatrist     Advanced Directive:   1. Do you have an Advanced Directive? NO    2. Would you like information on Advanced Directives?  NO

## 2021-06-22 NOTE — TELEPHONE ENCOUNTER
She has had a well woman exam and Pap smear with Dr. Mao [facility] [address] [place] [address]     Need pap and office note

## 2021-06-23 LAB
A-G RATIO,AGRAT: 1.5 RATIO (ref 1.1–2.6)
ABSOLUTE LYMPHOCYTE COUNT, 10803: 3.1 K/UL (ref 1–4.8)
ALBUMIN SERPL-MCNC: 4.6 G/DL (ref 3.5–5)
ALP SERPL-CCNC: 62 U/L (ref 25–115)
ALT SERPL-CCNC: 36 U/L (ref 5–40)
ANION GAP SERPL CALC-SCNC: 11 MMOL/L (ref 3–15)
AST SERPL W P-5'-P-CCNC: 32 U/L (ref 10–37)
BASOPHILS # BLD: 0.1 K/UL (ref 0–0.2)
BASOPHILS NFR BLD: 1 % (ref 0–2)
BILIRUB SERPL-MCNC: 0.3 MG/DL (ref 0.2–1.2)
BUN SERPL-MCNC: 14 MG/DL (ref 6–22)
CALCIUM SERPL-MCNC: 10.3 MG/DL (ref 8.4–10.5)
CHLORIDE SERPL-SCNC: 99 MMOL/L (ref 98–110)
CHOLEST SERPL-MCNC: 218 MG/DL (ref 110–200)
CO2 SERPL-SCNC: 28 MMOL/L (ref 20–32)
CREAT SERPL-MCNC: 0.9 MG/DL (ref 0.5–1.2)
EOSINOPHIL # BLD: 0.1 K/UL (ref 0–0.5)
EOSINOPHIL NFR BLD: 1 % (ref 0–6)
ERYTHROCYTE [DISTWIDTH] IN BLOOD BY AUTOMATED COUNT: 13.3 % (ref 10–15.5)
GFRAA, 66117: >60
GFRNA, 66118: >60
GLOBULIN,GLOB: 3 G/DL (ref 2–4)
GLUCOSE SERPL-MCNC: 105 MG/DL (ref 70–99)
GRANULOCYTES,GRANS: 61 % (ref 40–75)
HCT VFR BLD AUTO: 45.5 % (ref 35.1–46.5)
HCV AB SER IA-ACNC: NORMAL
HDLC SERPL-MCNC: 35 MG/DL
HDLC SERPL-MCNC: 6.2 MG/DL (ref 0–5)
HGB BLD-MCNC: 13.9 G/DL (ref 11.7–15.5)
LDL/HDL RATIO,LDHD: 3.7
LDLC SERPL CALC-MCNC: 130 MG/DL (ref 50–99)
LYMPHOCYTES, LYMLT: 28 % (ref 20–45)
MCH RBC QN AUTO: 33 PG (ref 26–34)
MCHC RBC AUTO-ENTMCNC: 31 G/DL (ref 31–36)
MCV RBC AUTO: 108 FL (ref 81–99)
MONOCYTES # BLD: 0.8 K/UL (ref 0.1–1)
MONOCYTES NFR BLD: 7 % (ref 3–12)
NEUTROPHILS # BLD AUTO: 6.8 K/UL (ref 1.8–7.7)
NON-HDL CHOLESTEROL, 011976: 183 MG/DL
PLATELET # BLD AUTO: 240 K/UL (ref 140–440)
PMV BLD AUTO: 11.3 FL (ref 9–13)
POTASSIUM SERPL-SCNC: 4.9 MMOL/L (ref 3.5–5.5)
PROT SERPL-MCNC: 7.6 G/DL (ref 6.4–8.3)
RBC # BLD AUTO: 4.21 M/UL (ref 3.8–5.2)
SODIUM SERPL-SCNC: 138 MMOL/L (ref 133–145)
TRIGL SERPL-MCNC: 266 MG/DL (ref 40–149)
TSH SERPL DL<=0.005 MIU/L-ACNC: 2.68 MCU/ML (ref 0.27–4.2)
VLDLC SERPL CALC-MCNC: 53 MG/DL (ref 8–30)
WBC # BLD AUTO: 11 K/UL (ref 4–11)

## 2021-06-24 NOTE — PROGRESS NOTES
All results are normal   Except cholesterol     elevated cholesterol panel  worse than before !!that can improve with lifestyle modifications and eating healthy fats and avoiding polyunsaturated fats and increase physical activities as tolerated

## 2024-05-31 NOTE — BH NOTES
Left a vm to call the office back to discuss symptoms.    Patient is currently pacing in her room back and forth and constantly flushing the toilet , and agitated patient given Haldol for agitation patient also explained the use of the medication will continue to monitor.